# Patient Record
Sex: MALE | Race: WHITE | Employment: PART TIME | URBAN - METROPOLITAN AREA
[De-identification: names, ages, dates, MRNs, and addresses within clinical notes are randomized per-mention and may not be internally consistent; named-entity substitution may affect disease eponyms.]

---

## 2020-08-24 ENCOUNTER — HOSPITAL ENCOUNTER (INPATIENT)
Age: 55
LOS: 6 days | Discharge: HOME | DRG: 192 | End: 2020-08-30
Attending: EMERGENCY MEDICINE | Admitting: FAMILY MEDICINE
Payer: MEDICAID

## 2020-08-24 ENCOUNTER — APPOINTMENT (OUTPATIENT)
Dept: GENERAL RADIOLOGY | Age: 55
DRG: 192 | End: 2020-08-24
Attending: EMERGENCY MEDICINE
Payer: MEDICAID

## 2020-08-24 DIAGNOSIS — Z20.822 SUSPECTED COVID-19 VIRUS INFECTION: ICD-10-CM

## 2020-08-24 DIAGNOSIS — R60.0 PEDAL EDEMA: ICD-10-CM

## 2020-08-24 DIAGNOSIS — I48.91 ATRIAL FIBRILLATION WITH RAPID VENTRICULAR RESPONSE (HCC): Primary | ICD-10-CM

## 2020-08-24 DIAGNOSIS — I42.9 CARDIOMYOPATHY, UNSPECIFIED TYPE (HCC): ICD-10-CM

## 2020-08-24 DIAGNOSIS — I50.21 ACUTE SYSTOLIC CONGESTIVE HEART FAILURE (HCC): ICD-10-CM

## 2020-08-24 LAB
ALBUMIN SERPL-MCNC: 3.1 G/DL (ref 3.5–5)
ALBUMIN/GLOB SERPL: 0.6 {RATIO} (ref 1.1–2.2)
ALP SERPL-CCNC: 108 U/L (ref 45–117)
ALT SERPL-CCNC: 30 U/L (ref 12–78)
ANION GAP SERPL CALC-SCNC: 4 MMOL/L (ref 5–15)
APPEARANCE UR: CLEAR
AST SERPL-CCNC: 34 U/L (ref 15–37)
BACTERIA URNS QL MICRO: NEGATIVE /HPF
BASOPHILS # BLD: 0.1 K/UL (ref 0–0.1)
BASOPHILS NFR BLD: 1 % (ref 0–1)
BILIRUB SERPL-MCNC: 1.4 MG/DL (ref 0.2–1)
BILIRUB UR QL: NEGATIVE
BNP SERPL-MCNC: 1686 PG/ML
BUN SERPL-MCNC: 15 MG/DL (ref 6–20)
BUN/CREAT SERPL: 17 (ref 12–20)
CALCIUM SERPL-MCNC: 8.7 MG/DL (ref 8.5–10.1)
CHLORIDE SERPL-SCNC: 101 MMOL/L (ref 97–108)
CO2 SERPL-SCNC: 32 MMOL/L (ref 21–32)
COLOR UR: ABNORMAL
COMMENT, HOLDF: NORMAL
CREAT SERPL-MCNC: 0.86 MG/DL (ref 0.7–1.3)
DIFFERENTIAL METHOD BLD: ABNORMAL
EOSINOPHIL # BLD: 0.2 K/UL (ref 0–0.4)
EOSINOPHIL NFR BLD: 2 % (ref 0–7)
EPITH CASTS URNS QL MICRO: ABNORMAL /LPF
ERYTHROCYTE [DISTWIDTH] IN BLOOD BY AUTOMATED COUNT: 14.6 % (ref 11.5–14.5)
GLOBULIN SER CALC-MCNC: 4.9 G/DL (ref 2–4)
GLUCOSE BLD STRIP.AUTO-MCNC: 117 MG/DL (ref 65–100)
GLUCOSE BLD STRIP.AUTO-MCNC: 127 MG/DL (ref 65–100)
GLUCOSE BLD STRIP.AUTO-MCNC: 145 MG/DL (ref 65–100)
GLUCOSE SERPL-MCNC: 150 MG/DL (ref 65–100)
GLUCOSE UR STRIP.AUTO-MCNC: NEGATIVE MG/DL
HCT VFR BLD AUTO: 40.7 % (ref 36.6–50.3)
HGB BLD-MCNC: 12.4 G/DL (ref 12.1–17)
HGB UR QL STRIP: NEGATIVE
HYALINE CASTS URNS QL MICRO: ABNORMAL /LPF (ref 0–5)
IMM GRANULOCYTES # BLD AUTO: 0 K/UL (ref 0–0.04)
IMM GRANULOCYTES NFR BLD AUTO: 0 % (ref 0–0.5)
KETONES UR QL STRIP.AUTO: NEGATIVE MG/DL
LEUKOCYTE ESTERASE UR QL STRIP.AUTO: NEGATIVE
LYMPHOCYTES # BLD: 1.6 K/UL (ref 0.8–3.5)
LYMPHOCYTES NFR BLD: 19 % (ref 12–49)
MCH RBC QN AUTO: 27 PG (ref 26–34)
MCHC RBC AUTO-ENTMCNC: 30.5 G/DL (ref 30–36.5)
MCV RBC AUTO: 88.7 FL (ref 80–99)
MONOCYTES # BLD: 0.8 K/UL (ref 0–1)
MONOCYTES NFR BLD: 9 % (ref 5–13)
NEUTS SEG # BLD: 5.8 K/UL (ref 1.8–8)
NEUTS SEG NFR BLD: 69 % (ref 32–75)
NITRITE UR QL STRIP.AUTO: NEGATIVE
NRBC # BLD: 0 K/UL (ref 0–0.01)
NRBC BLD-RTO: 0 PER 100 WBC
PH UR STRIP: 5.5 [PH] (ref 5–8)
PLATELET # BLD AUTO: 240 K/UL (ref 150–400)
PMV BLD AUTO: 11.7 FL (ref 8.9–12.9)
POTASSIUM SERPL-SCNC: 3.6 MMOL/L (ref 3.5–5.1)
PROT SERPL-MCNC: 8 G/DL (ref 6.4–8.2)
PROT UR STRIP-MCNC: NEGATIVE MG/DL
RBC # BLD AUTO: 4.59 M/UL (ref 4.1–5.7)
RBC #/AREA URNS HPF: ABNORMAL /HPF (ref 0–5)
SAMPLES BEING HELD,HOLD: NORMAL
SERVICE CMNT-IMP: ABNORMAL
SODIUM SERPL-SCNC: 137 MMOL/L (ref 136–145)
SP GR UR REFRACTOMETRY: 1.01 (ref 1–1.03)
TROPONIN I SERPL-MCNC: <0.05 NG/ML
UR CULT HOLD, URHOLD: NORMAL
UROBILINOGEN UR QL STRIP.AUTO: 2 EU/DL (ref 0.2–1)
WBC # BLD AUTO: 8.5 K/UL (ref 4.1–11.1)
WBC URNS QL MICRO: ABNORMAL /HPF (ref 0–4)

## 2020-08-24 PROCEDURE — 81001 URINALYSIS AUTO W/SCOPE: CPT

## 2020-08-24 PROCEDURE — 85025 COMPLETE CBC W/AUTO DIFF WBC: CPT

## 2020-08-24 PROCEDURE — 65270000029 HC RM PRIVATE

## 2020-08-24 PROCEDURE — 99284 EMERGENCY DEPT VISIT MOD MDM: CPT

## 2020-08-24 PROCEDURE — 87635 SARS-COV-2 COVID-19 AMP PRB: CPT

## 2020-08-24 PROCEDURE — 74011000258 HC RX REV CODE- 258: Performed by: EMERGENCY MEDICINE

## 2020-08-24 PROCEDURE — 74011250636 HC RX REV CODE- 250/636: Performed by: FAMILY MEDICINE

## 2020-08-24 PROCEDURE — 82962 GLUCOSE BLOOD TEST: CPT

## 2020-08-24 PROCEDURE — 96365 THER/PROPH/DIAG IV INF INIT: CPT

## 2020-08-24 PROCEDURE — 74011000250 HC RX REV CODE- 250: Performed by: EMERGENCY MEDICINE

## 2020-08-24 PROCEDURE — 84484 ASSAY OF TROPONIN QUANT: CPT

## 2020-08-24 PROCEDURE — 36415 COLL VENOUS BLD VENIPUNCTURE: CPT

## 2020-08-24 PROCEDURE — 93005 ELECTROCARDIOGRAM TRACING: CPT

## 2020-08-24 PROCEDURE — 71045 X-RAY EXAM CHEST 1 VIEW: CPT

## 2020-08-24 PROCEDURE — 83880 ASSAY OF NATRIURETIC PEPTIDE: CPT

## 2020-08-24 PROCEDURE — 80053 COMPREHEN METABOLIC PANEL: CPT

## 2020-08-24 RX ORDER — INSULIN LISPRO 100 [IU]/ML
INJECTION, SOLUTION INTRAVENOUS; SUBCUTANEOUS
Status: DISCONTINUED | OUTPATIENT
Start: 2020-08-24 | End: 2020-08-30 | Stop reason: HOSPADM

## 2020-08-24 RX ORDER — DEXTROSE MONOHYDRATE 100 MG/ML
0-250 INJECTION, SOLUTION INTRAVENOUS AS NEEDED
Status: DISCONTINUED | OUTPATIENT
Start: 2020-08-24 | End: 2020-08-30 | Stop reason: HOSPADM

## 2020-08-24 RX ORDER — POLYETHYLENE GLYCOL 3350 17 G/17G
17 POWDER, FOR SOLUTION ORAL DAILY PRN
Status: DISCONTINUED | OUTPATIENT
Start: 2020-08-24 | End: 2020-08-30 | Stop reason: HOSPADM

## 2020-08-24 RX ORDER — SODIUM CHLORIDE 0.9 % (FLUSH) 0.9 %
5-40 SYRINGE (ML) INJECTION EVERY 8 HOURS
Status: DISCONTINUED | OUTPATIENT
Start: 2020-08-24 | End: 2020-08-30 | Stop reason: HOSPADM

## 2020-08-24 RX ORDER — FUROSEMIDE 10 MG/ML
40 INJECTION INTRAMUSCULAR; INTRAVENOUS ONCE
Status: COMPLETED | OUTPATIENT
Start: 2020-08-24 | End: 2020-08-24

## 2020-08-24 RX ORDER — ENOXAPARIN SODIUM 100 MG/ML
40 INJECTION SUBCUTANEOUS DAILY
Status: DISCONTINUED | OUTPATIENT
Start: 2020-08-25 | End: 2020-08-24 | Stop reason: DRUGHIGH

## 2020-08-24 RX ORDER — PROMETHAZINE HYDROCHLORIDE 25 MG/1
12.5 TABLET ORAL
Status: DISCONTINUED | OUTPATIENT
Start: 2020-08-24 | End: 2020-08-30 | Stop reason: HOSPADM

## 2020-08-24 RX ORDER — ENOXAPARIN SODIUM 150 MG/ML
150 INJECTION SUBCUTANEOUS EVERY 12 HOURS
Status: DISCONTINUED | OUTPATIENT
Start: 2020-08-24 | End: 2020-08-25

## 2020-08-24 RX ORDER — SODIUM CHLORIDE 0.9 % (FLUSH) 0.9 %
5-40 SYRINGE (ML) INJECTION AS NEEDED
Status: DISCONTINUED | OUTPATIENT
Start: 2020-08-24 | End: 2020-08-30 | Stop reason: HOSPADM

## 2020-08-24 RX ORDER — MAGNESIUM SULFATE 100 %
4 CRYSTALS MISCELLANEOUS AS NEEDED
Status: DISCONTINUED | OUTPATIENT
Start: 2020-08-24 | End: 2020-08-30 | Stop reason: HOSPADM

## 2020-08-24 RX ORDER — ONDANSETRON 2 MG/ML
4 INJECTION INTRAMUSCULAR; INTRAVENOUS
Status: DISCONTINUED | OUTPATIENT
Start: 2020-08-24 | End: 2020-08-30 | Stop reason: HOSPADM

## 2020-08-24 RX ORDER — ACETAMINOPHEN 650 MG/1
650 SUPPOSITORY RECTAL
Status: DISCONTINUED | OUTPATIENT
Start: 2020-08-24 | End: 2020-08-30 | Stop reason: HOSPADM

## 2020-08-24 RX ORDER — ACETAMINOPHEN 325 MG/1
650 TABLET ORAL
Status: DISCONTINUED | OUTPATIENT
Start: 2020-08-24 | End: 2020-08-30 | Stop reason: HOSPADM

## 2020-08-24 RX ADMIN — DILTIAZEM HYDROCHLORIDE 2.5 MG/HR: 5 INJECTION, SOLUTION INTRAVENOUS at 13:32

## 2020-08-24 RX ADMIN — Medication 10 ML: at 14:13

## 2020-08-24 RX ADMIN — Medication 10 ML: at 22:00

## 2020-08-24 RX ADMIN — FUROSEMIDE 40 MG: 10 INJECTION, SOLUTION INTRAMUSCULAR; INTRAVENOUS at 14:13

## 2020-08-24 RX ADMIN — ENOXAPARIN SODIUM 150 MG: 150 INJECTION SUBCUTANEOUS at 18:00

## 2020-08-24 NOTE — ED PROVIDER NOTES
Mr. Ashly Bolden is a 65-year-old male who presents to the ER with complaints of leg swelling and shortness of breath. He said that his symptoms started 5 days ago. He saw his doctor 2 days ago. He admits he had swelling and redness in his right leg. He was started on diuretic and an antibiotic. He said the swelling is worsened on that leg is now on the left leg. He is also had worsening redness of both legs. He said he gets short of breath with exertion. He said he also has a problem self up to sleep because he is short of breath. He denies fevers or chills. He denies similar symptoms in the past.  He denies a history of an irregular heartbeat. No runny nose, sore throat, or cough. No chest pain. No abdominal pain. Denies changes with his urine or bowel meds, apart from urinating more with a diuretic.            Past Medical History:   Diagnosis Date    Diabetes ([address]     Hypertension        Past Surgical History:   Procedure Laterality Date    HAND/FINGER SURGERY UNLISTED      HX HEENT      tonsillectomy         Family History:   Problem Relation Age of Onset    Diabetes Mother     Cancer Father         brain       Social History     Socioeconomic History    Marital status: SINGLE     Spouse name: Not on file    Number of children: Not on file    Years of education: Not on file    Highest education level: Not on file   Occupational History    Not on file   Social Needs    Financial resource strain: Not on file    Food insecurity     Worry: Not on file     Inability: Not on file    Transportation needs     Medical: Not on file     Non-medical: Not on file   Tobacco Use    Smoking status: Current Every Day Smoker     Packs/day: 1.00     Years: 30.00     Pack years: 30.00    Smokeless tobacco: Never Used   Substance and Sexual Activity    Alcohol use: No    Drug use: No    Sexual activity: Yes     Partners: Female   Lifestyle    Physical activity     Days per week: Not on file     Minutes per session: Not on file    Stress: Not on file   Relationships    Social connections     Talks on phone: Not on file     Gets together: Not on file     Attends Worship service: Not on file     Active member of club or organization: Not on file     Attends meetings of clubs or organizations: Not on file     Relationship status: Not on file    Intimate partner violence     Fear of current or ex partner: Not on file     Emotionally abused: Not on file     Physically abused: Not on file     Forced sexual activity: Not on file   Other Topics Concern    Not on file   Social History Narrative    Not on file         ALLERGIES: Sulfur    Review of Systems   Constitutional: Negative for chills and fever. HENT: Negative for rhinorrhea and sore throat. Respiratory: Positive for shortness of breath. Negative for cough. Cardiovascular: Positive for leg swelling. Negative for chest pain. Gastrointestinal: Negative for abdominal pain, diarrhea, nausea and vomiting. Genitourinary: Negative for dysuria and hematuria. Musculoskeletal: Negative for arthralgias and myalgias. Skin: Positive for rash. Negative for pallor. Neurological: Negative for dizziness, weakness and light-headedness. All other systems reviewed and are negative. Vitals:    08/24/20 0807   BP: 143/89   Pulse: 81   Resp: 18   Temp: 98.4 °F (36.9 °C)   SpO2: 96%   Weight: 152.9 kg (337 lb)   Height: 5' 10\" (1.778 m)            Physical Exam     Vital signs reviewed. Nursing notes reviewed.     Const:  No acute distress, well developed, well nourished  Head:  Atraumatic, normocephalic  Eyes:  PERRL, conjunctiva normal, no scleral icterus  Neck:  Supple, trachea midline  Cardiovascular: Tachycardic, irregularly irregular  Resp:  No resp distress, no increased work of breathing  Abd:  Soft, non-tender, non-distended, no rebound, no guarding, no CVA tenderness  MSK: Bilateral pedal edema  Neuro:  Alert and oriented x3, no cranial nerve defect  Skin: Bilateral lower extremity erythema that is not warm to the touch or tender to palpation  Psych: normal mood and affect, behavior is normal, judgement and thought content is normal          MDM  Number of Diagnoses or Management Options     Amount and/or Complexity of Data Reviewed  Clinical lab tests: ordered and reviewed  Tests in the radiology section of CPT®: ordered and reviewed  Review and summarize past medical records: yes    Patient Progress  Patient progress: stable  Critical Care Time:  35 min. Mr. Clare Gutierrez is a 63yo male who presents to the ER with complaints of SOB and worsening edema. He was found to be in afib with RVR. I started him on a dilt gtt. Xray shows edema. No pneumonia on xray. Covid-19 sent. I believe that his lower extremity edema and erythema are likely 2/2 venous stasis, rather than cellulitis. Pt.  To be evaluated for admission by the hospitalist.        Procedures

## 2020-08-24 NOTE — H&P
[facility] Adult  Hospitalist Group  History and Physical    Primary Care Provider: Annmarie Fields MD  Date of Service:  8/24/2020    Subjective:     Pia Mtz is a 63 y.o. male who presents for evaluation of shortness of breath and leg swelling. Patient has been having swelling in the legs right more than left along with shortness of breath mainly on exertion. He denies any significant orthopnea but he states he always sleeps propped up. He denies any chest pain or palpitation. He was seen by his PCP 2 days ago and was prescribed Lasix and antibiotic for redness in his legs. But his symptoms not significantly improved and therefore presented to the emergency room. Patient was noted to have atrial fibrillation with rapid ventricular response. Also has elevated BNP and chest x-ray showing cardiomegaly with early interstitial edema. Patient has no known history of atrial fibrillation. Patient was started on Cardizem drip and hospitalist service requested to admit the patient for further evaluation management. Review of Systems:    A comprehensive review of systems was negative except for that written in the History of Present Illness. Past Medical History:   Diagnosis Date    Diabetes ([address]     Hypertension       Past Surgical History:   Procedure Laterality Date    HAND/FINGER SURGERY UNLISTED      HX HEENT      tonsillectomy     Prior to Admission medications    Medication Sig Start Date End Date Taking? Authorizing Provider   hydrochlorothiazide (HYDRODIURIL) 25 mg tablet Take 25 mg by mouth daily. Provider, Historical   lisinopril (PRINIVIL, ZESTRIL) 10 mg tablet Take  by mouth daily. Provider, Historical   METHADONE HCL (METHADONE PO) Take  by mouth.     Provider, Historical     Allergies   Allergen Reactions    Sulfur Unknown (comments)      Family History   Problem Relation Age of Onset    Diabetes Mother     Cancer Father         brain        SOCIAL HISTORY:  Patient resides at Home. Patient ambulates with no assistance. Smoking history: cigarettes, 1 per day  Alcohol history: None    FAMILY HISTORY:  Diabetes  Brain cancer    Objective:       Physical Exam:   Visit Vitals  /89 (BP 1 Location: Right arm, BP Patient Position: At rest)   Pulse 81   Temp 98.4 °F (36.9 °C)   Resp 18   Ht 5' 10\" (1.778 m)   Wt 152.9 kg (337 lb)   SpO2 96%   BMI 48.35 kg/m²     General:  Alert, cooperative, no distress, appears stated age. Head:  Normocephalic, without obvious abnormality, atraumatic. Eyes:  Conjunctivae/corneas clear. PERRL, EOMs intact. Fundi benign   Ears:  Normal TMs and external ear canals both ears. Nose: Nares normal. Septum midline. Mucosa normal. No drainage or sinus tenderness. Throat: Lips, mucosa, and tongue normal. Teeth and gums normal.   Neck: Supple, symmetrical, trachea midline, no adenopathy, thyroid: no enlargement/tenderness/nodules, no carotid bruit and no JVD. Back:   Symmetric, no curvature. ROM normal. No CVA tenderness. Lungs:   Clear to auscultation bilaterally. Chest wall:  No tenderness or deformity. Heart:  Regular rate and rhythm, S1, S2 normal, no murmur, click, rub or gallop. Abdomen:   Soft, non-tender. Bowel sounds normal. No masses,  No organomegaly. Genitalia:  Deferred   Rectal:  Deferred   Extremities:  Erythematous rash present on bilateral lower extremities at the front, pitting edema bilateral lower extremities   Pulses: 2+ and symmetric all extremities. Skin: Skin color, texture, turgor normal. No rashes or lesions   Lymph nodes: Cervical, supraclavicular, and axillary nodes normal.   Neurologic: CNII-XII intact. Normal strength, sensation and reflexes throughout. Cap refill: Brisk, less than 3 seconds  Pulses: 2+, symmetric in all extremities    ECG:  normal EKG, normal sinus rhythm, unchanged from previous tracings, atrial fibrillation, rate 120     Data Review:    All diagnostic labs and studies have been reviewed. Chest x-ray Cardiomegaly with mild interstitial prominence. No prior studies for comparison. .      Assessment and Plan:     1. Atrial fibrillation with rapid ventricular response  -New onset atrial fibrillation with RVR  -Continue with diltiazem drip  -Check echocardiogram, cardiology to evaluate    2. Elevated BNP  -Also with recent weight gain, and chest x-ray with early interstitial edema concerning for CHF  -Start Lasix 40 mg IV daily  -Check echocardiogram    3. Hypertension  -Hold home medication lisinopril and HCTZ  -Starting IV Lasix and diltiazem drip  -Monitor blood pressure    4. Diabetes  -Insulin sliding scale coverage    5. DVT prophylaxis  -Lovenox and SCDs    Estimated length of stay is 2 midnights.     FUNCTIONAL STATUS PRIOR TO HOSPITALIZATION Ambulates Independently (including history of recent falls):     Signed By: Dewayne Mtz MD     August 24, 2020

## 2020-08-25 LAB
ANION GAP SERPL CALC-SCNC: 6 MMOL/L (ref 5–15)
ATRIAL RATE: 326 BPM
BUN SERPL-MCNC: 14 MG/DL (ref 6–20)
BUN/CREAT SERPL: 17 (ref 12–20)
CALCIUM SERPL-MCNC: 8.7 MG/DL (ref 8.5–10.1)
CALCULATED R AXIS, ECG10: 18 DEGREES
CALCULATED T AXIS, ECG11: 108 DEGREES
CHLORIDE SERPL-SCNC: 102 MMOL/L (ref 97–108)
CO2 SERPL-SCNC: 31 MMOL/L (ref 21–32)
CREAT SERPL-MCNC: 0.81 MG/DL (ref 0.7–1.3)
DIAGNOSIS, 93000: NORMAL
GLUCOSE BLD STRIP.AUTO-MCNC: 103 MG/DL (ref 65–100)
GLUCOSE BLD STRIP.AUTO-MCNC: 99 MG/DL (ref 65–100)
GLUCOSE SERPL-MCNC: 109 MG/DL (ref 65–100)
HEALTH STATUS, XMCV2T: NORMAL
MAGNESIUM SERPL-MCNC: 2.1 MG/DL (ref 1.6–2.4)
POTASSIUM SERPL-SCNC: 3.9 MMOL/L (ref 3.5–5.1)
Q-T INTERVAL, ECG07: 360 MS
QRS DURATION, ECG06: 86 MS
QTC CALCULATION (BEZET), ECG08: 508 MS
SARS-COV-2, COV2NT: NOT DETECTED
SERVICE CMNT-IMP: ABNORMAL
SERVICE CMNT-IMP: NORMAL
SODIUM SERPL-SCNC: 139 MMOL/L (ref 136–145)
SOURCE, COVRS: NORMAL
SPECIMEN SOURCE, FCOV2M: NORMAL
SPECIMEN TYPE, XMCV1T: NORMAL
VENTRICULAR RATE, ECG03: 120 BPM

## 2020-08-25 PROCEDURE — 74011250636 HC RX REV CODE- 250/636: Performed by: FAMILY MEDICINE

## 2020-08-25 PROCEDURE — 82962 GLUCOSE BLOOD TEST: CPT

## 2020-08-25 PROCEDURE — 74011000250 HC RX REV CODE- 250: Performed by: FAMILY MEDICINE

## 2020-08-25 PROCEDURE — 80048 BASIC METABOLIC PNL TOTAL CA: CPT

## 2020-08-25 PROCEDURE — 65660000001 HC RM ICU INTERMED STEPDOWN

## 2020-08-25 PROCEDURE — 36415 COLL VENOUS BLD VENIPUNCTURE: CPT

## 2020-08-25 PROCEDURE — 74011250637 HC RX REV CODE- 250/637: Performed by: INTERNAL MEDICINE

## 2020-08-25 PROCEDURE — 74011250636 HC RX REV CODE- 250/636: Performed by: INTERNAL MEDICINE

## 2020-08-25 PROCEDURE — 83735 ASSAY OF MAGNESIUM: CPT

## 2020-08-25 PROCEDURE — 74011636637 HC RX REV CODE- 636/637: Performed by: FAMILY MEDICINE

## 2020-08-25 PROCEDURE — 74011000258 HC RX REV CODE- 258: Performed by: FAMILY MEDICINE

## 2020-08-25 RX ORDER — FUROSEMIDE 10 MG/ML
40 INJECTION INTRAMUSCULAR; INTRAVENOUS ONCE
Status: COMPLETED | OUTPATIENT
Start: 2020-08-25 | End: 2020-08-25

## 2020-08-25 RX ORDER — METOPROLOL TARTRATE 25 MG/1
25 TABLET, FILM COATED ORAL EVERY 12 HOURS
Status: DISCONTINUED | OUTPATIENT
Start: 2020-08-25 | End: 2020-08-26

## 2020-08-25 RX ORDER — FUROSEMIDE 10 MG/ML
40 INJECTION INTRAMUSCULAR; INTRAVENOUS 2 TIMES DAILY
Status: DISCONTINUED | OUTPATIENT
Start: 2020-08-25 | End: 2020-08-27

## 2020-08-25 RX ORDER — FUROSEMIDE 10 MG/ML
20 INJECTION INTRAMUSCULAR; INTRAVENOUS 2 TIMES DAILY
Status: DISCONTINUED | OUTPATIENT
Start: 2020-08-25 | End: 2020-08-25

## 2020-08-25 RX ORDER — ENOXAPARIN SODIUM 100 MG/ML
INJECTION SUBCUTANEOUS
Status: DISPENSED
Start: 2020-08-25 | End: 2020-08-25

## 2020-08-25 RX ORDER — ENOXAPARIN SODIUM 100 MG/ML
1 INJECTION SUBCUTANEOUS EVERY 12 HOURS
Status: DISCONTINUED | OUTPATIENT
Start: 2020-08-25 | End: 2020-08-29

## 2020-08-25 RX ORDER — FUROSEMIDE 40 MG/1
40 TABLET ORAL DAILY
COMMUNITY

## 2020-08-25 RX ORDER — CEFADROXIL 500 MG/1
500 CAPSULE ORAL 2 TIMES DAILY
COMMUNITY
End: 2020-08-30

## 2020-08-25 RX ORDER — METHADONE HYDROCHLORIDE 10 MG/1
40 TABLET ORAL DAILY
Status: DISCONTINUED | OUTPATIENT
Start: 2020-08-25 | End: 2020-08-30 | Stop reason: HOSPADM

## 2020-08-25 RX ADMIN — FUROSEMIDE 40 MG: 10 INJECTION, SOLUTION INTRAMUSCULAR; INTRAVENOUS at 18:37

## 2020-08-25 RX ADMIN — Medication 10 ML: at 21:11

## 2020-08-25 RX ADMIN — METOPROLOL TARTRATE 25 MG: 25 TABLET, FILM COATED ORAL at 21:09

## 2020-08-25 RX ADMIN — METOPROLOL TARTRATE 25 MG: 25 TABLET, FILM COATED ORAL at 12:06

## 2020-08-25 RX ADMIN — DILTIAZEM HYDROCHLORIDE 5 MG/HR: 5 INJECTION, SOLUTION INTRAVENOUS at 09:07

## 2020-08-25 RX ADMIN — Medication 10 ML: at 16:09

## 2020-08-25 RX ADMIN — Medication 10 ML: at 06:00

## 2020-08-25 RX ADMIN — ENOXAPARIN SODIUM 150 MG: 150 INJECTION SUBCUTANEOUS at 08:30

## 2020-08-25 RX ADMIN — FUROSEMIDE 40 MG: 10 INJECTION, SOLUTION INTRAMUSCULAR; INTRAVENOUS at 12:06

## 2020-08-25 RX ADMIN — INSULIN LISPRO 2 UNITS: 100 INJECTION, SOLUTION INTRAVENOUS; SUBCUTANEOUS at 12:24

## 2020-08-25 RX ADMIN — Medication 10 ML: at 01:26

## 2020-08-25 RX ADMIN — METHADONE HYDROCHLORIDE 40 MG: 10 TABLET ORAL at 12:06

## 2020-08-25 NOTE — WOUND CARE
Wound Care Note:  
 
New consult placed by nurse request for bilateral cracked red legs, blisters on belly, red in belly folds Chart shows: 
Admitted for atrial fibrillation with RVR Past Medical History:  
Diagnosis Date  Diabetes ([facility]  Hypertension WBC = 8.5 on 8/24/20 Admitted from home Assessment:  
Patient is A&O x 4, communicative, continent with no assistance needed in repositioning. Bed: Total Care Diet: Diabetic consistent carb regular Patient reports no pain. Bilateral heels, buttocks, and sacral skin intact and without erythema. 1. POA bilateral lower anterior legs with blanchable erythema, no increase in temperature and no tenderness noted, legs with edema. Nourishing Skin Cream applied. 2.  POA right dorsal foot with crusted wound measuring 0.8 cm x 0.3 cm x 0.1 cm, wound bed light crusted, no drainage, wound edges are open. Hydrocolloid applied. 3.  POA right lateral thigh with lightly crusted wound measuring 1 cm x 1.5 cm x 0.1 cm, wound bed is lightly crusted, no drainage, wound edges are open, april-wound intact. Hydrocolloid applied. 4. POA pannus with some moisture and erythema, no satellite lesions or odor noted. Baby powder can be used to manage moisture. Spoke with Dr. Payam Bentley, wound care orders obtained. Patient up in chair. Recommendations:   
Bilateral lower legs- Every 12 hours apply Nourishing Skin Cream (purple tube) Right dorsal foot and right lateral thigh- Please maintain Exuderm Hydrocolloid up to one week or change as needed with soiling or rolled edges. Please use adhesive remover wipes when changing. Pannus- apply baby powder as needed Skin Care & Pressure Prevention: 
Minimize layers of linen/pads under patient to optimize support surface. Turn/reposition approximately every 2 hours and offload heels. Manage incontinence / promote continence Nourishing Skin Cream to dry skin, minimize use of briefs when able Discussed above plan with patient Lorena Abdullahi RN Transition of Care: Plan to follow as needed while admitted to hospital. 
 
PAMELLA Shelton, RN, [facility], [facility]. 
office [phone] 
pager [phone] or call  to page

## 2020-08-25 NOTE — ED NOTES
Attempted to call report to [facility] again.  RN unavailable to take report-  reports RN needs to speak with nursing supervisor prior to taking report  Charge RN notified

## 2020-08-25 NOTE — CONSULTS
CARDIOLOGY CONSULT                  Subjective:    Date of  Admission: 8/24/2020 11:28 AM     Admission type:Emergency    Nelly Stauffer is a 63 y.o. male admitted for Atrial fibrillation with RVR ([facility] [I48.91]. He presented with dyspnea and edema. Started about 5 days ago and did not improve with PO lasix and antibiotics started by PCP. Symptoms were initially exertional but then occurred with rest.   ED w/u showed elevated BNP and normal troponin. Evidence of interstitial edema.     Patient Active Problem List    Diagnosis Date Noted    Atrial fibrillation with RVR ([facility] 08/24/2020      Sven Mercado MD  Past Medical History:   Diagnosis Date    Diabetes ([facility]     Hypertension       Past Surgical History:   Procedure Laterality Date    HAND/FINGER SURGERY UNLISTED      HX HEENT      tonsillectomy     Allergies   Allergen Reactions    Sulfur Unknown (comments)      Family History   Problem Relation Age of Onset    Diabetes Mother     Cancer Father         brain      Current Facility-Administered Medications   Medication Dose Route Frequency    enoxaparin (LOVENOX) 40 mg/0.4 mL injection        metoprolol tartrate (LOPRESSOR) tablet 25 mg  25 mg Oral Q12H    furosemide (LASIX) injection 40 mg  40 mg IntraVENous BID    methadone (DOLOPHINE) tablet 40 mg  40 mg Oral DAILY    dilTIAZem (CARDIZEM) 125 mg in dextrose 5% 125 mL infusion  0-15 mg/hr IntraVENous TITRATE    sodium chloride (NS) flush 5-40 mL  5-40 mL IntraVENous Q8H    sodium chloride (NS) flush 5-40 mL  5-40 mL IntraVENous PRN    acetaminophen (TYLENOL) tablet 650 mg  650 mg Oral Q6H PRN    Or    acetaminophen (TYLENOL) suppository 650 mg  650 mg Rectal Q6H PRN    polyethylene glycol (MIRALAX) packet 17 g  17 g Oral DAILY PRN    promethazine (PHENERGAN) tablet 12.5 mg  12.5 mg Oral Q6H PRN    Or    ondansetron (ZOFRAN) injection 4 mg  4 mg IntraVENous Q6H PRN    insulin lispro (HUMALOG) injection   SubCUTAneous AC&HS    glucose chewable tablet 16 g  4 Tab Oral PRN    glucagon (GLUCAGEN) injection 1 mg  1 mg IntraMUSCular PRN    dextrose 10% infusion 0-250 mL  0-250 mL IntraVENous PRN    enoxaparin (LOVENOX) injection 150 mg  150 mg SubCUTAneous Q12H         Review of Symptoms:  Gen - no F/C/S  Eyes - no vision changes  ENT - no sore throat, rhinorrhea, otalgia  CV - no CP, no palpitations, no orthopnea, no PND, + TRAVON  Resp no cough, + SOB/MORTENSEN  GI - no AP, no n/v/d/c   - no dysuria, no hematuria  MSK - no abnormal joint pains  Skin - no rashes  Neuro - no HA, no numbness, no weakness, no slurred speech  Psych - no change in mood         Physical Exam    Visit Vitals  /88 (BP 1 Location: Right arm, BP Patient Position: At rest)   Pulse 79   Temp 97.9 °F (36.6 °C)   Resp 23   Ht 5' 10\" (1.778 m)   Wt 158.3 kg (349 lb)   SpO2 95%   BMI 50.08 kg/m²     EXAM PERFORMED VIA [employer] SOFTWARE WITH ASSISTANCE OF RN STAFF    NAD  Skin warm and dry  Nl conjunctiva  Oropharynx without exudate.     Neck supple  No resp distress  AF on monitor  Abdomen soft and non tender  +TRAVON  Pulses 2+ radials  Neuro:  Grossly intact  Appropriate      Cardiographics    Telemetry: AFIB  ECG: atrial fibrillation,  Labs:   Recent Results (from the past 24 hour(s))   SARS-COV-2    Collection Time: 08/24/20  2:04 PM   Result Value Ref Range    Specimen source Nasopharyngeal      SARS-CoV-2 PENDING     Specimen source Nasopharyngeal      Specimen type NP Swab      Health status Symptomatic Testing     GLUCOSE, POC    Collection Time: 08/24/20  5:15 PM   Result Value Ref Range    Glucose (POC) 127 (H) 65 - 100 mg/dL    Performed by [employer] [facility]    GLUCOSE, POC    Collection Time: 08/24/20  8:59 PM   Result Value Ref Range    Glucose (POC) 145 (H) 65 - 100 mg/dL    Performed by [employer]    GLUCOSE, POC    Collection Time: 08/24/20 10:59 PM   Result Value Ref Range    Glucose (POC) 117 (H) 65 - 100 mg/dL    Performed by Debra [address], BASIC Collection Time: 08/25/20  1:34 AM   Result Value Ref Range    Sodium 139 136 - 145 mmol/L    Potassium 3.9 3.5 - 5.1 mmol/L    Chloride 102 97 - 108 mmol/L    CO2 31 21 - 32 mmol/L    Anion gap 6 5 - 15 mmol/L    Glucose 109 (H) 65 - 100 mg/dL    BUN 14 6 - 20 MG/DL    Creatinine 0.81 0.70 - 1.30 MG/DL    BUN/Creatinine ratio 17 12 - 20      GFR est AA >60 >60 ml/min/1.73m2    GFR est non-AA >60 >60 ml/min/1.73m2    Calcium 8.7 8.5 - 10.1 MG/DL   MAGNESIUM    Collection Time: 08/25/20  1:34 AM   Result Value Ref Range    Magnesium 2.1 1.6 - 2.4 mg/dL   GLUCOSE, POC    Collection Time: 08/25/20  8:35 AM   Result Value Ref Range    Glucose (POC) 103 (H) 65 - 100 mg/dL    Performed by Kasi Haley         Assessment and Plan:     This is a 63 yom with decompensated AF and evidence of volume overload    Cont with diltiazem drip  Started metoprolol to hopefully decrease dilt rate  Daily lytes  Echo pending  Increase lasix dosing  NPO at MN for NNEKA/DCCV  Further recs based on echo findings     Assessment:

## 2020-08-25 NOTE — PROGRESS NOTES
ROMY  RUR-9%-Low    1. Patient to transition home when medically stable, pending other treatment helto     Reason for Admission:   Shortness of breath and leg swelling                   RUR Score:         9% Low            Plan for utilizing home health:     TBD     PCP: First and Last name:  Dr. Wallace Flynn   Name of Practice:    Are you a current patient: Yes/No: Yes   Approximate date of last visit: NA   Can you participate in a virtual visit with your PCP: NA                    Current Advanced Directive/Advance Care Plan: Patient is a Full code and there is no AMD on file. Patient's mother is listed as emergency contact. Transition of Care Plan:     CM attempted to reach patient via cell phone and room phone but he was not accessible. CM contacted mother who provided information to complete CM assessment. Patient lives with his mother. Per mother, Patient is independent with self-care and ADLs. Patient drives. He recently started using a cane at home due to leg swelling. Preferred pharmacies are [facility] and [facility] per mother. Patient's mother states that she does not drive so other family will provide patient with transportation home. CM to monitor.      Sofia Cruz MS

## 2020-08-25 NOTE — PROGRESS NOTES
2346-TRANSFER - IN REPORT:    Verbal report received from Freya Vera RN(name) on Sherry Cardona  being received from ED(unit) for routine progression of care      Report consisted of patients Situation, Background, Assessment and   Recommendations(SBAR). Information from the following report(s) SBAR, Kardex, ED Summary, Intake/Output, MAR, Recent Results, Med Rec Status, and Cardiac Rhythm Emperatriz Dalton  was reviewed with the receiving nurse. Opportunity for questions and clarification was provided. 0010-Assessment completed upon patients arrival to unit and care assumed. DUAL SKIN ASSESSMENT:  Primary Nurse Chencho Amanda RN and Emperatriz Dalton RN performed a dual skin assessment on this patient. The following impairements are noted: two blisters on abdomen near folds, redness under belly folds, blister on side of abdomen, redness and janie on bilateral lower legs. Dominic score is 21           Problem: Falls - Risk of  Goal: Absence of Falls  Outcome: Progressing Towards Goal  Call light in reach, Patient to call for help with toileting needs, Stay With Me (per policy), Urinal in reach, Toileting schedule/hourly rounds             Problem: Afib Pathway: Day 1  Goal: Medications  Outcome: Progressing Towards Goal  Pt on Cardizem drip, titrated down to 5mg/hr, will continue to monitor HR and BP          Hourly rounding done, pt in A fib, on room air, O2 saturation greater than 90%, using urinal, urine yellow and clear, no complaints of pain throughout shift. 0800-Bedside shift change report given to [address] [place] [address] (oncoming nurse) by Richa Guadalupe RN (offgoing nurse). Report included the following information SBAR, Kardex, ED Summary, MAR, Accordion, Recent Results, Med Rec Status and Cardiac Rhythm A fib.

## 2020-08-25 NOTE — PROGRESS NOTES
Spiritual Care Assessment/Progress Note  [facility]      NAME: Phong Herrera      MRN: [id number]  AGE: 63 y.o.  SEX: male  Protestant Affiliation: Hindu   Language: English     8/25/2020     Total Time (in minutes): 20     Spiritual Assessment begun in [facility] [place] [facility] 2 through conversation with:         [x]Patient        [] Family    [] Friend(s)        Reason for Consult: Advance medical directive consult, Initial/Spiritual assessment, patient floor     Spiritual beliefs: (Please include comment if needed)     [x] Identifies with a edil tradition: Hindu        [] Supported by a edil community:            [] Claims no spiritual orientation:           [] Seeking spiritual identity:                [] Adheres to an individual form of spirituality:           [] Not able to assess:                           Identified resources for coping:      [x] Prayer                               [] Music                  [] Guided Imagery     [x] Family/friends                 [] Pet visits     [] Devotional reading                         [] Unknown     [] Other:                                               Interventions offered during this visit: (See comments for more details)    Patient Interventions: Advance medical directive consult, Affirmation of emotions/emotional suffering, Affirmation of edil, Catharsis/review of pertinent events in supportive environment, Coping skills reviewed/reinforced, Iconic (affirming the presence of God/Higher Power), Prayer (actual)           Plan of Care:     [] Support spiritual and/or cultural needs    [] Support AMD and/or advance care planning process      [] Support grieving process   [] Coordinate Rites and/or Rituals    [] Coordination with community clergy   [] No spiritual needs identified at this time   [] Detailed Plan of Care below (See Comments)  [] Make referral to Music Therapy  [] Make referral to Pet Therapy     [] Make referral to Addiction services  [] Make referral to [facility]  [] Make referral to Spiritual Care Partner  [] No future visits requested        [x] Follow up visits as needed     Comments:  visit for initial spiritual assessment and Advance Directive (AMD) consult. Patient under Covid-19 isolation precautions. Called patient on telephone to the telephone at bedside. Patient answered telephone. Provided spiritual presence and listening as he spoke of his present thougths, feelings, and concerns. Spoke of his health and events leading to this admission. Says he is feeling a little better now. Described good support from his family and girlfriend. Is awaiting test results to rule out Covid-19 and is hopeful he will come back negative today and be able to remove isolation once that is determined. Says he is feeling a little lonely and cut off being in isolation. HE does have his telephone and his girlfriend is going to bring his  so that the staff can give it to him so he can keep his phone charged. He has been able to speak to his girlfriend and mother. He did not have any other concerns, but wished to speak about his edil and edil journey. [facility], but enjoys attending [employer] and attends a parish in [place] [address] [place].  requested for Advance Directive (AMD) consult. Patient requested information concerning AMD.  Informed patient of purpose of AMD and explained the document page by page. Also informed him I would leave a blank AMD form, a copy of the booklet, \"Your Right to Decide,\" and information care explaining services available through [facility]. Patient would like to review this material prior to making a decision. He appeared comforted and encouraged as a result of this conversation and expressed gratitude for this conversation. Visited by Rev. Maxwell Saunders MDiv, [facility], [address]   paging service: [phone])

## 2020-08-25 NOTE — PROGRESS NOTES
[facility] Adult  Hospitalist Group                                                                                          Hospitalist Progress Note  Yeyo Smith MD  Answering service: [phone] OR [phone] from in house phone        Date of Service:  2020  NAME:  Justin Blandon  :  1965  MRN:  [id number]      Admission Summary:   Justin Blandon is a 63 y.o. male who presents for evaluation of shortness of breath and leg swelling. Patient has been having swelling in the legs right more than left along with shortness of breath mainly on exertion. He denies any significant orthopnea but he states he always sleeps propped up. He denies any chest pain or palpitation. He was seen by his PCP 2 days ago and was prescribed Lasix and antibiotic for redness in his legs. But his symptoms not significantly improved and therefore presented to the emergency room. Patient was noted to have atrial fibrillation with rapid ventricular response. Also has elevated BNP and chest x-ray showing cardiomegaly with early interstitial edema. Patient has no known history of atrial fibrillation. Patient was started on Cardizem drip and hospitalist service requested to admit the patient for further evaluation management. Interval history / Subjective:   Patient seen and examined today. He said he is feeling good. Both of the legs are swollen and red  He is not feeling short of breath    He is worried about his heart rhythmAnd his dose of her methadone     Assessment & Plan:     1. Atrial fibrillation with rapid ventricular response  -New onset atrial fibrillation with RVR  -Continue with diltiazem drip  Transition to Lopressor. On Lovenox therapeutic dose  -Check echocardiogram, cardiology on board  NNEKA or direct cardioversion planned for tomorrow by cardiology       2.   Acute heart failure type unknown.  -Also with recent weight gain, and chest x-ray with early interstitial edema concerning for CHF  -Change Lasix to 40 twice daily  -Check echocardiogram     3. Hypertension  -Hold home medication lisinopril and HCTZ  -Starting IV Lasix and diltiazem drip       4 Diabetes  -Insulin sliding scale coverage  Accu-Cheks  Hemoglobin A1c     5. DVT prophylaxis  -Lovenox and SCDs        Code status:   DVT prophylaxis:     Care Plan discussed with: Patient/Family  Anticipated Disposition: Home w/Family  Anticipated Discharge: Less than 24 hours     Hospital Problems  Never Reviewed          Codes Class Noted POA    Atrial fibrillation with RVR [facility]) ICD-10-CM: I48.91  ICD-9-CM: 427.31  8/24/2020 Unknown                Review of Systems:   A comprehensive review of systems was negative except for that written in the HPI. Vital Signs:    Last 24hrs VS reviewed since prior progress note. Most recent are:  Visit Vitals  /68 (BP 1 Location: Right arm, BP Patient Position: At rest)   Pulse (!) 58   Temp 97.6 °F (36.4 °C)   Resp 17   Ht 5' 10\" (1.778 m)   Wt 158.3 kg (349 lb)   SpO2 96%   BMI 50.08 kg/m²         Intake/Output Summary (Last 24 hours) at 8/25/2020 1726  Last data filed at 8/25/2020 1603  Gross per 24 hour   Intake 97.79 ml   Output 1450 ml   Net -1352.21 ml        Physical Examination:             Constitutional:  No acute distress, cooperative, pleasant    ENT:  Oral mucosa moist, oropharynx benign. Resp:  CTA bilaterally. No wheezing/rhonchi/rales. No accessory muscle use   CV:  Regular rhythm, normal rate, no murmurs, gallops, rubs    GI:  Soft, non distended, non tender. normoactive bowel sounds, no hepatosplenomegaly     Musculoskeletal:  No edema, warm, 2+ pulses throughout    Neurologic:  Moves all extremities.   AAOx3, CN II-XII reviewed            Data Review:    Review and/or order of clinical lab test      Labs:     Recent Labs     08/24/20  0824   WBC 8.5   HGB 12.4   HCT 40.7        Recent Labs     08/25/20  0134 08/24/20  0824    137   K 3.9 3.6    101   CO2 31 32   BUN 14 15   CREA 0.81 0.86   * 150*   CA 8.7 8.7   MG 2.1  --      Recent Labs     08/24/20 0824   ALT 30      TBILI 1.4*   TP 8.0   ALB 3.1*   GLOB 4.9*     No results for input(s): INR, PTP, APTT, INREXT in the last 72 hours. No results for input(s): FE, TIBC, PSAT, FERR in the last 72 hours. No results found for: FOL, RBCF   No results for input(s): PH, PCO2, PO2 in the last 72 hours.   Recent Labs     08/24/20  0824   TROIQ <0.05     No results found for: CHOL, CHOLX, CHLST, CHOLV, HDL, HDLP, LDL, LDLC, DLDLP, TGLX, TRIGL, TRIGP, CHHD, CHHDX  Lab Results   Component Value Date/Time    Glucose (POC) 103 (H) 08/25/2020 08:35 AM    Glucose (POC) 117 (H) 08/24/2020 10:59 PM    Glucose (POC) 145 (H) 08/24/2020 08:59 PM    Glucose (POC) 127 (H) 08/24/2020 05:15 PM     Lab Results   Component Value Date/Time    Color YELLOW/STRAW 08/24/2020 08:24 AM    Appearance CLEAR 08/24/2020 08:24 AM    Specific gravity 1.009 08/24/2020 08:24 AM    pH (UA) 5.5 08/24/2020 08:24 AM    Protein Negative 08/24/2020 08:24 AM    Glucose Negative 08/24/2020 08:24 AM    Ketone Negative 08/24/2020 08:24 AM    Bilirubin Negative 08/24/2020 08:24 AM    Urobilinogen 2.0 (H) 08/24/2020 08:24 AM    Nitrites Negative 08/24/2020 08:24 AM    Leukocyte Esterase Negative 08/24/2020 08:24 AM    Epithelial cells FEW 08/24/2020 08:24 AM    Bacteria Negative 08/24/2020 08:24 AM    WBC 0-4 08/24/2020 08:24 AM    RBC 0-5 08/24/2020 08:24 AM         Medications Reviewed:     Current Facility-Administered Medications   Medication Dose Route Frequency    enoxaparin (LOVENOX) 40 mg/0.4 mL injection        metoprolol tartrate (LOPRESSOR) tablet 25 mg  25 mg Oral Q12H    furosemide (LASIX) injection 40 mg  40 mg IntraVENous BID    methadone (DOLOPHINE) tablet 40 mg  40 mg Oral DAILY    enoxaparin (LOVENOX) injection 160 mg  1 mg/kg SubCUTAneous Q12H    dilTIAZem (CARDIZEM) 125 mg in dextrose 5% 125 mL infusion  0-15 mg/hr IntraVENous TITRATE    sodium chloride (NS) flush 5-40 mL  5-40 mL IntraVENous Q8H    sodium chloride (NS) flush 5-40 mL  5-40 mL IntraVENous PRN    acetaminophen (TYLENOL) tablet 650 mg  650 mg Oral Q6H PRN    Or    acetaminophen (TYLENOL) suppository 650 mg  650 mg Rectal Q6H PRN    polyethylene glycol (MIRALAX) packet 17 g  17 g Oral DAILY PRN    promethazine (PHENERGAN) tablet 12.5 mg  12.5 mg Oral Q6H PRN    Or    ondansetron (ZOFRAN) injection 4 mg  4 mg IntraVENous Q6H PRN    insulin lispro (HUMALOG) injection   SubCUTAneous AC&HS    glucose chewable tablet 16 g  4 Tab Oral PRN    glucagon (GLUCAGEN) injection 1 mg  1 mg IntraMUSCular PRN    dextrose 10% infusion 0-250 mL  0-250 mL IntraVENous PRN     ______________________________________________________________________  EXPECTED LENGTH OF STAY: 2d 4h  ACTUAL LENGTH OF STAY:          1                 Sana mAor MD

## 2020-08-25 NOTE — PROGRESS NOTES
Bedside shift change report given to Nancy (oncoming nurse) by Loyd Mccloud (offgoing nurse). Report included the following information SBAR, Kardex, ED Summary, Intake/Output and Accordion. Pt  has been on Cradizem gtt and PO metoprolol. A fib rate controlled.

## 2020-08-25 NOTE — ROUTINE PROCESS
TRANSFER - OUT REPORT: 
 
Verbal report given to VICENTE Cruz (name) on Derrick Cardona  being transferred to [facility] (unit) for routine progression of care Report consisted of patients Situation, Background, Assessment and  
Recommendations(SBAR). Information from the following report(s) SBAR, ED Summary, MAR, Recent Results and Cardiac Rhythm AFIB  was reviewed with the receiving nurse. Lines:  
Peripheral IV 08/24/20 Left Antecubital (Active) Site Assessment Clean, dry, & intact 08/24/20 9046 Phlebitis Assessment 0 08/24/20 0829 Infiltration Assessment 0 08/24/20 0829 Dressing Status Clean, dry, & intact 08/24/20 1955 Dressing Type Tape;Transparent 08/24/20 9050 Hub Color/Line Status Green;Capped;Flushed 08/24/20 1386 Action Taken Blood drawn 08/24/20 5093 Opportunity for questions and clarification was provided. Patient transported with: 
 Monitor Registered Nurse NAVEED Gaspar

## 2020-08-25 NOTE — ACP (ADVANCE CARE PLANNING)
requested for Advance Directive (AMD) consult. Patient requested information concerning AMD.  Informed patient of purpose of AMD and explained the document page by page. Also informed him I would leave a blank AMD form, a copy of the booklet, \"Your Right to Decide,\" and information care explaining services available through [facility]. Patient would like to review this material prior to making a decision. Visited by Rev. Kai Pacheco MDiv, [facility], [facility]   paging service: [phone])

## 2020-08-26 ENCOUNTER — ANESTHESIA (OUTPATIENT)
Dept: CARDIAC CATH/INVASIVE PROCEDURES | Age: 55
DRG: 192 | End: 2020-08-26
Payer: MEDICAID

## 2020-08-26 ENCOUNTER — APPOINTMENT (OUTPATIENT)
Dept: NON INVASIVE DIAGNOSTICS | Age: 55
DRG: 192 | End: 2020-08-26
Attending: FAMILY MEDICINE
Payer: MEDICAID

## 2020-08-26 ENCOUNTER — APPOINTMENT (OUTPATIENT)
Dept: CARDIAC CATH/INVASIVE PROCEDURES | Age: 55
DRG: 192 | End: 2020-08-26
Attending: INTERNAL MEDICINE
Payer: MEDICAID

## 2020-08-26 ENCOUNTER — ANESTHESIA EVENT (OUTPATIENT)
Dept: CARDIAC CATH/INVASIVE PROCEDURES | Age: 55
DRG: 192 | End: 2020-08-26
Payer: MEDICAID

## 2020-08-26 LAB
ANION GAP SERPL CALC-SCNC: 5 MMOL/L (ref 5–15)
BUN SERPL-MCNC: 18 MG/DL (ref 6–20)
BUN/CREAT SERPL: 21 (ref 12–20)
CALCIUM SERPL-MCNC: 8.3 MG/DL (ref 8.5–10.1)
CHLORIDE SERPL-SCNC: 101 MMOL/L (ref 97–108)
CHOLEST SERPL-MCNC: 76 MG/DL
CO2 SERPL-SCNC: 29 MMOL/L (ref 21–32)
CREAT SERPL-MCNC: 0.85 MG/DL (ref 0.7–1.3)
EST. AVERAGE GLUCOSE BLD GHB EST-MCNC: 171 MG/DL
GLUCOSE BLD STRIP.AUTO-MCNC: 100 MG/DL (ref 65–100)
GLUCOSE BLD STRIP.AUTO-MCNC: 134 MG/DL (ref 65–100)
GLUCOSE BLD STRIP.AUTO-MCNC: 137 MG/DL (ref 65–100)
GLUCOSE BLD STRIP.AUTO-MCNC: 150 MG/DL (ref 65–100)
GLUCOSE BLD STRIP.AUTO-MCNC: 167 MG/DL (ref 65–100)
GLUCOSE BLD STRIP.AUTO-MCNC: 96 MG/DL (ref 65–100)
GLUCOSE SERPL-MCNC: 97 MG/DL (ref 65–100)
HBA1C MFR BLD: 7.6 % (ref 4–5.6)
HDLC SERPL-MCNC: 15 MG/DL
HDLC SERPL: 5.1 {RATIO} (ref 0–5)
LDLC SERPL CALC-MCNC: 46.6 MG/DL (ref 0–100)
LIPID PROFILE,FLP: ABNORMAL
POTASSIUM SERPL-SCNC: 3.6 MMOL/L (ref 3.5–5.1)
SERVICE CMNT-IMP: ABNORMAL
SERVICE CMNT-IMP: NORMAL
SERVICE CMNT-IMP: NORMAL
SODIUM SERPL-SCNC: 135 MMOL/L (ref 136–145)
TRIGL SERPL-MCNC: 72 MG/DL (ref ?–150)
VLDLC SERPL CALC-MCNC: 14.4 MG/DL

## 2020-08-26 PROCEDURE — 77030039046 HC PAD DEFIB RADIOTRNSPNT CNMD -B

## 2020-08-26 PROCEDURE — 65660000000 HC RM CCU STEPDOWN

## 2020-08-26 PROCEDURE — 74011250637 HC RX REV CODE- 250/637: Performed by: INTERNAL MEDICINE

## 2020-08-26 PROCEDURE — 36415 COLL VENOUS BLD VENIPUNCTURE: CPT

## 2020-08-26 PROCEDURE — 74011250636 HC RX REV CODE- 250/636: Performed by: NURSE ANESTHETIST, CERTIFIED REGISTERED

## 2020-08-26 PROCEDURE — 74011000258 HC RX REV CODE- 258: Performed by: INTERNAL MEDICINE

## 2020-08-26 PROCEDURE — 76060000031 HC ANESTHESIA FIRST 0.5 HR

## 2020-08-26 PROCEDURE — 93005 ELECTROCARDIOGRAM TRACING: CPT

## 2020-08-26 PROCEDURE — 80061 LIPID PANEL: CPT

## 2020-08-26 PROCEDURE — 80048 BASIC METABOLIC PNL TOTAL CA: CPT

## 2020-08-26 PROCEDURE — B24BZZ4 ULTRASONOGRAPHY OF HEART WITH AORTA, TRANSESOPHAGEAL: ICD-10-PCS | Performed by: INTERNAL MEDICINE

## 2020-08-26 PROCEDURE — 74011250636 HC RX REV CODE- 250/636: Performed by: INTERNAL MEDICINE

## 2020-08-26 PROCEDURE — 83036 HEMOGLOBIN GLYCOSYLATED A1C: CPT

## 2020-08-26 PROCEDURE — 74011000258 HC RX REV CODE- 258: Performed by: NURSE ANESTHETIST, CERTIFIED REGISTERED

## 2020-08-26 PROCEDURE — 5A2204Z RESTORATION OF CARDIAC RHYTHM, SINGLE: ICD-10-PCS | Performed by: INTERNAL MEDICINE

## 2020-08-26 PROCEDURE — 92960 CARDIOVERSION ELECTRIC EXT: CPT

## 2020-08-26 RX ORDER — PROPOFOL 10 MG/ML
INJECTION, EMULSION INTRAVENOUS AS NEEDED
Status: DISCONTINUED | OUTPATIENT
Start: 2020-08-26 | End: 2020-08-26 | Stop reason: HOSPADM

## 2020-08-26 RX ORDER — LOSARTAN POTASSIUM 25 MG/1
25 TABLET ORAL DAILY
Status: DISCONTINUED | OUTPATIENT
Start: 2020-08-26 | End: 2020-08-28

## 2020-08-26 RX ORDER — SPIRONOLACTONE 25 MG/1
12.5 TABLET ORAL DAILY
Status: DISCONTINUED | OUTPATIENT
Start: 2020-08-27 | End: 2020-08-28

## 2020-08-26 RX ORDER — SODIUM CHLORIDE 9 MG/ML
INJECTION, SOLUTION INTRAVENOUS
Status: DISCONTINUED | OUTPATIENT
Start: 2020-08-26 | End: 2020-08-26 | Stop reason: HOSPADM

## 2020-08-26 RX ORDER — METOPROLOL TARTRATE 50 MG/1
50 TABLET ORAL EVERY 12 HOURS
Status: DISCONTINUED | OUTPATIENT
Start: 2020-08-26 | End: 2020-08-30 | Stop reason: HOSPADM

## 2020-08-26 RX ADMIN — PROPOFOL 20 MG: 10 INJECTION, EMULSION INTRAVENOUS at 13:29

## 2020-08-26 RX ADMIN — AMIODARONE HYDROCHLORIDE 1 MG/MIN: 50 INJECTION, SOLUTION INTRAVENOUS at 14:33

## 2020-08-26 RX ADMIN — PROPOFOL 30 MG: 10 INJECTION, EMULSION INTRAVENOUS at 13:27

## 2020-08-26 RX ADMIN — SODIUM CHLORIDE: 9 INJECTION, SOLUTION INTRAVENOUS at 13:15

## 2020-08-26 RX ADMIN — FUROSEMIDE 40 MG: 10 INJECTION, SOLUTION INTRAMUSCULAR; INTRAVENOUS at 17:36

## 2020-08-26 RX ADMIN — PROPOFOL 50 MG: 10 INJECTION, EMULSION INTRAVENOUS at 13:24

## 2020-08-26 RX ADMIN — ENOXAPARIN SODIUM 160 MG: 80 INJECTION SUBCUTANEOUS at 10:10

## 2020-08-26 RX ADMIN — Medication 10 ML: at 21:38

## 2020-08-26 RX ADMIN — Medication 10 ML: at 16:12

## 2020-08-26 RX ADMIN — DEXTROSE MONOHYDRATE 150 MG: 5 INJECTION, SOLUTION INTRAVENOUS at 13:43

## 2020-08-26 RX ADMIN — AMIODARONE HYDROCHLORIDE 0.5 MG/MIN: 50 INJECTION, SOLUTION INTRAVENOUS at 21:11

## 2020-08-26 RX ADMIN — METOPROLOL TARTRATE 25 MG: 25 TABLET, FILM COATED ORAL at 09:32

## 2020-08-26 RX ADMIN — FUROSEMIDE 40 MG: 10 INJECTION, SOLUTION INTRAMUSCULAR; INTRAVENOUS at 09:32

## 2020-08-26 RX ADMIN — METOPROLOL TARTRATE 50 MG: 50 TABLET, FILM COATED ORAL at 21:14

## 2020-08-26 RX ADMIN — LOSARTAN POTASSIUM 25 MG: 25 TABLET, FILM COATED ORAL at 17:36

## 2020-08-26 RX ADMIN — Medication 10 ML: at 06:18

## 2020-08-26 RX ADMIN — PROPOFOL 100 MG: 10 INJECTION, EMULSION INTRAVENOUS at 13:22

## 2020-08-26 RX ADMIN — METHADONE HYDROCHLORIDE 40 MG: 10 TABLET ORAL at 10:00

## 2020-08-26 NOTE — PROGRESS NOTES
Problem: Falls - Risk of  Goal: Absence of Falls  Outcome: Progressing Towards Goal  Call light in reach, Patient to call for help with toileting needs, Stay With Me (per policy), Urinal in reach, Toileting schedule/hourly rounds           Problem: Afib Pathway: Day 2  Goal: Medications  Outcome: Progressing Towards Goal  Pt off cardizem drip, on Metoprolol  25 PO every 12 hours, HR remains less than 100        0030-Bedside shift change report given to [address] [place] [address] (oncoming nurse) by Gracy Benavides RN (offgoing nurse). Report included the following information SBAR, Kardex, ED Summary, Intake/Output, MAR, Recent Results, Med Rec Status and Cardiac Rhythm A fib.

## 2020-08-26 NOTE — PROCEDURES
Procedure:  TRANSESOPHAGEAL ECHO    Indication:     Description:  The patient  was brought to the holding area in a fasting state. Both benefits and risks of the procedure were explained in detail to the patient. Patient understands the risks of major complications  Serious complications including death, sustained ventricular tachycardia, and severe angina have been estimated at less than 1 in 5000. There is 1 in 10,000 risk of perforationMethemoglobinemia is very rare. Two in 3000 cased serious complications related to patient sedation  Minor complications  Minor complications are seen in fewer than 1 in 500 examinations. These include transient bronchospasm, transient hypoxia, nonsustained ventricular tachycardia, transient atrial fibrillation, minimal hemoptysis, vomiting, or pharyngeal hematoma. The oropharynx was locally anesthesized using benzocaine spray. Patient received anesthesia via anesthesia team. Insertion of the probe was uneventful. Patient tolerated the procedure well, there were no complications. FINDINGS  1. Severely reduced systolic function  2. LA smoke but no SHARON thrombus  3. Mild MR    Successful DCCV of AF--> NSR with x1 200 J and then x2 360 J synchronized shocks.

## 2020-08-26 NOTE — NURSE NAVIGATOR
Chart reviewed by Heart Failure Nurse Navigator. Heart Failure database completed. EF:  Echo pending    ACEi/ARB/ARNi: lisinopril 10 mg daily     BB:     Aldosterone Antagonist:     Obstructive Sleep Apnea Screening:   STOP-BANG score:   Referred to Sleep Medicine:     CRT **. NYHA Functional Class documentation requested. Heart Failure Teach Back in Patient Education. Heart Failure Avoiding Triggers on Discharge Instructions. Cardiologist: not yet consulted      Post discharge follow up phone call to be made within 48-72 hours of discharge.

## 2020-08-26 NOTE — PROGRESS NOTES
Got notified that last night dose of lovenox not given by the night nurse. No hold orders were there and no contact to MD  made for holding it.    He missed his dose last night for no apparent reasons!!!

## 2020-08-26 NOTE — ANESTHESIA PREPROCEDURE EVALUATION
Relevant Problems   No relevant active problems       Anesthetic History   No history of anesthetic complications            Review of Systems / Medical History  Patient summary reviewed, nursing notes reviewed and pertinent labs reviewed    Pulmonary  Within defined limits                 Neuro/Psych   Within defined limits           Cardiovascular    Hypertension        Dysrhythmias : atrial fibrillation      Exercise tolerance: <4 METS     GI/Hepatic/Renal                Endo/Other    Diabetes    Morbid obesity     Other Findings            Physical Exam    Airway  Mallampati: II  TM Distance: > 6 cm  Neck ROM: normal range of motion   Mouth opening: Normal     Cardiovascular    Rhythm: regular  Rate: normal         Dental  No notable dental hx       Pulmonary  Breath sounds clear to auscultation               Abdominal         Other Findings            Anesthetic Plan    ASA: 3  Anesthesia type: MAC          Induction: Intravenous  Anesthetic plan and risks discussed with: Patient

## 2020-08-26 NOTE — PROGRESS NOTES
Cardiology Progress Note  2020     Admit Date: 2020  Admit Diagnosis: Atrial fibrillation with RVR ([facility] [address] [I48.91]  CC: none currently    Assessment:   Active Problems:    Atrial fibrillation with RVR ([address] (2020)      Plan:     NPO for NNEKA/DCCV today  TTE pending  Increased metoprolol, off of diltiazem drip  Daily lytes  Ischemic evaluation based on EF    Subjective:      Red Cardona has no cardiac c/o. HR controlled. Feels continued improvement    Objective:    Physical Exam:  Overall VSSAF;    Visit Vitals  BP (!) 149/103 (BP Patient Position: At rest)   Pulse 78   Temp 97.9 °F (36.6 °C)   Resp 15   Ht 5' 10\" (1.778 m)   Wt (!) 164.7 kg (363 lb 1.6 oz)   SpO2 96%   BMI 52.10 kg/m²     Temp (24hrs), Av.9 °F (36.6 °C), Min:97.6 °F (36.4 °C), Max:98.2 °F (36.8 °C)    Patient Vitals for the past 8 hrs:   Pulse   20 1239 78   20 1222 72   20 0932 67   20 0732 (!) 55   20 0657 80   20 0550 74    Patient Vitals for the past 8 hrs:   Resp   20 1239 15   20 1222 14   20 0732 21   20 0657 18   20 0550 13    Patient Vitals for the past 8 hrs:   BP   20 1239 (!) 149/103   20 1222 145/87   20 0932 112/70   20 0732 (!) 159/98   20 0657 137/80   20 0550 131/85       1901 -  0700  In: 541 [P.O.:400; I.V.:141]  Out: 1625 [Urine:1625]      General Appearance: Well developed, well nourished, no acute distress. Ears/Nose/Mouth/Throat:   Normal MM; anicteric. JVP: WNL   Resp:   Lungs clear to auscultation bilaterally. Nl resp effort. Cardiovascular:  Irreg   Abdomen:   Soft, non-tender, bowel sounds are present. Extremities: No edema bilaterally. Skin:  Neuro: Warm and dry.   A/O x3, grossly nonfocal                         Data Review:     Telemetry independently reviewed :   AFIB       ECG independently reviewed: AF  Labs:   Recent Results (from the past 24 hour(s))   GLUCOSE, POC Collection Time: 08/25/20  5:06 PM   Result Value Ref Range    Glucose (POC) 134 (H) 65 - 100 mg/dL    Performed by Travis 13, POC    Collection Time: 08/25/20 11:53 PM   Result Value Ref Range    Glucose (POC) 99 65 - 100 mg/dL    Performed by Luciano Syed RN    LIPID PANEL    Collection Time: 08/26/20  4:39 AM   Result Value Ref Range    LIPID PROFILE          Cholesterol, total 76 <200 MG/DL    Triglyceride 72 <150 MG/DL    HDL Cholesterol 15 MG/DL    LDL, calculated 46.6 0 - 100 MG/DL    VLDL, calculated 14.4 MG/DL    CHOL/HDL Ratio 5.1 (H) 0.0 - 5.0     HEMOGLOBIN A1C WITH EAG    Collection Time: 08/26/20  4:39 AM   Result Value Ref Range    Hemoglobin A1c 7.6 (H) 4.0 - 5.6 %    Est. average glucose 760 mg/dL   METABOLIC PANEL, BASIC    Collection Time: 08/26/20  4:39 AM   Result Value Ref Range    Sodium 135 (L) 136 - 145 mmol/L    Potassium 3.6 3.5 - 5.1 mmol/L    Chloride 101 97 - 108 mmol/L    CO2 29 21 - 32 mmol/L    Anion gap 5 5 - 15 mmol/L    Glucose 97 65 - 100 mg/dL    BUN 18 6 - 20 MG/DL    Creatinine 0.85 0.70 - 1.30 MG/DL    BUN/Creatinine ratio 21 (H) 12 - 20      GFR est AA >60 >60 ml/min/1.73m2    GFR est non-AA >60 >60 ml/min/1.73m2    Calcium 8.3 (L) 8.5 - 10.1 MG/DL   GLUCOSE, POC    Collection Time: 08/26/20  8:41 AM   Result Value Ref Range    Glucose (POC) 96 65 - 100 mg/dL    Performed by Jamila Hood 17, POC    Collection Time: 08/26/20 12:14 PM   Result Value Ref Range    Glucose (POC) 100 65 - 100 mg/dL    Performed by Kathryne Bosworth       Current medications reviewed       Paige Cronin MD

## 2020-08-26 NOTE — PROGRESS NOTES
Cardiac Cath Lab Recovery Arrival Note:      Jackie Sever Merkel arrived to Cardiac Cath Lab, Recovery Area. Staff introduced to patient. Patient identifiers verified with NAME and DATE OF BIRTH. Procedure verified with patient. Consent forms reviewed and signed by patient or authorized representative and verified. Allergies verified. Patient and family oriented to department. Patient and family informed of procedure and plan of care. Questions answered with review. Patient prepped for procedure, per orders from physician, prior to arrival.    Patient on cardiac monitor, non-invasive blood pressure, SPO2 monitor. On RA. Patient is A&Ox 4. Patient reports no pain. Patient in stretcher, in low position, with side rails up, call bell within reach, patient instructed to call if assistance as needed. Patient prep in: [address], [place] [address]. Patient family has pager #   Family in: . Prep by: SANTOS Araya RN

## 2020-08-26 NOTE — PROGRESS NOTES
Bedside and Verbal shift change report given to Sharron Mccloud (oncoming nurse) by Jason Landry RN (offgoing nurse). Report included the following information SBAR, Kardex, ED Summary, Intake/Output, MAR, Accordion, Recent Results and Cardiac Rhythm NSR. Problem: Falls - Risk of  Goal: *Absence of Falls  Description: Document Ivar Du Fall Risk and appropriate interventions in the flowsheet. Outcome: Progressing Towards Goal  Note: Fall Risk Interventions:            Medication Interventions: Evaluate medications/consider consulting pharmacy, Teach patient to arise slowly    Elimination Interventions: Call light in reach, Toilet paper/wipes in reach              Problem: Afib Pathway: Day 2  Goal: Diagnostic Test/Procedures  Outcome: Progressing Towards Goal  Note: Patient had NNEKA and Cardioversion today. Goal: Medications  Outcome: Progressing Towards Goal  Note: Patient receiving PO Metoprolol. Post cardioversion, patient on Amiodarone drip. Goal: Respiratory  Outcome: Progressing Towards Goal  Note: Patient on room air. Oxygen saturations maintained in 90s. Problem: Diabetes Self-Management  Goal: *Disease process and treatment process  Description: Define diabetes and identify own type of diabetes; list 3 options for treating diabetes. Outcome: Progressing Towards Goal  Note: Patient indicates a lack of understanding regarding diabetes management and diagnosis. Diabetic management consulted.

## 2020-08-26 NOTE — PROGRESS NOTES
TRANSFER - IN REPORT:    Verbal report received from [address] on Gabby Cardona  being received from procedural area for routine progression of care. Report consisted of patients Situation, Background, Assessment and Recommendations(SBAR). Information from the following report(s) Procedure Summary, MAR and Recent Results was reviewed with the receiving clinician. Opportunity for questions and clarification was provided. Assessment completed upon patients arrival to [facility] and care assumed. Cardiac Cath Lab Recovery Arrival Note:    Gabby Cardona arrived to [facility] recovery area. Patient procedure= CV. Patient on cardiac monitor, non-invasive blood pressure, SPO2 monitor. On  or O2 @ 2 lpm via NC.  IV  of NS on pump at 25 ml/hr. Patient status doing well without problems. Patient is A&Ox 3. Patient reports no c/o's.

## 2020-08-26 NOTE — PROGRESS NOTES
Cardiac Cath Lab Procedure Area Arrival Note:    Josef Cardona arrived to Cardiac Cath Lab, Procedure Area. Patient identifiers verified with NAME and DATE OF BIRTH. Procedure verified with patient. Consent forms verified. Allergies verified. Patient informed of procedure and plan of care. Questions answered with review. Patient voiced understanding of procedure and plan of care. Patient on cardiac monitor, non-invasive blood pressure, SPO2 monitor. On room air then placed on O2 @ 2 lpm via NC.  IV of normal saline on pump at 25 ml/hr. Patient status doing well without problems. Patient is A&Ox 4. Patient reports no pain. Patient medicated during procedure with orders obtained and verified by Dr. Florin Foss. Refer to patients Cardiac Cath Lab PROCEDURE REPORT for vital signs, assessment, status, and response during procedure, printed at end of case. Printed report on chart or scanned into chart.

## 2020-08-26 NOTE — ANESTHESIA POSTPROCEDURE EVALUATION
Post-Anesthesia Evaluation and Assessment    Patient: Niki Jamison MRN: [id number]  SSN: [id number]    YOB: 1965  Age: 63 y.o. Sex: male      I have evaluated the patient and they are stable and ready for discharge from the PACU. Cardiovascular Function/Vital Signs  Visit Vitals  /79 (BP 1 Location: Left arm, BP Patient Position: At rest;Sitting)   Pulse 63   Temp 36.8 °C (98.2 °F)   Resp 13   Ht 5' 10\" (1.778 m)   Wt (!) 164.7 kg (363 lb 1.6 oz)   SpO2 96%   BMI 52.10 kg/m²       Patient is status post * No anesthesia type entered * anesthesia for * No procedures listed *. Nausea/Vomiting: None    Postoperative hydration reviewed and adequate. Pain:  Pain Scale 1: Numeric (0 - 10) (08/26/20 1350)  Pain Intensity 1: 0 (08/26/20 1350)   Managed    Neurological Status:   Neuro  Neurologic State: Alert;Eyes open to stimulus; Appropriate for age (08/26/20 0830)  Orientation Level: Oriented X4;Appropriate for age (08/26/20 0830)  Cognition: Appropriate decision making; Appropriate for age attention/concentration; Appropriate safety awareness; Follows commands;Recognition of people/places (08/26/20 0830)  Speech: Appropriate for age (08/26/20 0830)  LUE Motor Response: Purposeful (08/26/20 0830)  LLE Motor Response: Purposeful;Numbness;Tingling (08/26/20 0830)  RUE Motor Response: Purposeful (08/26/20 0830)  RLE Motor Response: Purposeful;Numbness;Tingling (08/26/20 0830)   At baseline    Mental Status, Level of Consciousness: Alert and  oriented to person, place, and time    Pulmonary Status:   O2 Device: Nasal cannula (08/26/20 1350)   Adequate oxygenation and airway patent    Complications related to anesthesia: None    Post-anesthesia assessment completed. No concerns    Signed By: Edwin Erickson MD     August 26, 2020              * No procedures listed *. MAC    <BSHSIANPOST>    INITIAL Post-op Vital signs: No vitals data found for the desired time range.

## 2020-08-26 NOTE — PROGRESS NOTES
Physician Progress Note      PATIENTEAGLE Red  CSN #:                  [id number]  :                       1965  ADMIT DATE:       2020 11:28 AM  DISCH DATE:  RESPONDING  PROVIDER #:        Wendy Jacobs MD          QUERY TEXT:    [place] 56 Attending,    Patient admitted with BMI 50.8. If possible, please document in progress notes and discharge summary if you are evaluating and /or treating any of the following: The medical record reflects the following:  Risk Factors: 63 WM  Clinical Indicators: 5'10\"  349 lbs hx/o DM II and HTN  Treatment: diabetic consistent carb diet, I&Os, SSI and BG monitoring, PO Lopressor 25 mg PO BID    Thank you  Mary CAN RN [facility]  Clinical Documentation Improvement  Office Phone   Options provided:  -- Morbid obesity  -- Obesity  -- BMI not clinically significant  -- Other - I will add my own diagnosis  -- Disagree - Not applicable / Not valid  -- Disagree - Clinically unable to determine / Unknown  -- Refer to Clinical Documentation Reviewer    PROVIDER RESPONSE TEXT:    This patient has morbid obesity.       Query created by: Mariah Loja on 2020 11:38 AM      Electronically signed by:  Mavis Castanon MD 2020 7:10 PM

## 2020-08-26 NOTE — PROGRESS NOTES
Pt had an uneventful night. Received all meds and care according to orders. Vitals remained stable. Safety parameters maintained. Pt NPO since midnight. Effective handoff given to oncoming Nurse(s) Karen Latif and her preceptee.

## 2020-08-26 NOTE — PROGRESS NOTES
[facility] Adult  Hospitalist Group                                                                                          Hospitalist Progress Note  Sandra Granado MD  Answering service: [phone] from in house phone        Date of Service:  2020  NAME:  Azalia Luna  :  1965  MRN:  [id number]      Admission Summary:   Azalia Luna is a 63 y.o. male who presents for evaluation of shortness of breath and leg swelling. Patient has been having swelling in the legs right more than left along with shortness of breath mainly on exertion. He denies any significant orthopnea but he states he always sleeps propped up. He denies any chest pain or palpitation. He was seen by his PCP 2 days ago and was prescribed Lasix and antibiotic for redness in his legs. But his symptoms not significantly improved and therefore presented to the emergency room. Patient was noted to have atrial fibrillation with rapid ventricular response. Also has elevated BNP and chest x-ray showing cardiomegaly with early interstitial edema. Patient has no known history of atrial fibrillation. Patient was started on Cardizem drip and hospitalist service requested to admit the patient for further evaluation management. Interval history / Subjective:   Patient seen and examined today. he is feeling ok and goinmg for cardioversion      Assessment & Plan:     1. Atrial fibrillation with rapid ventricular response  -New onset atrial fibrillation with RVR  Was on diltiazem drip  Transition to Lopressor. On Lovenox therapeutic dose  Pending  echocardiogram, cardiology on board  NNEKA with no clot   S/P DCCV ,  , successful        2. Acute heart failure type unknown.  -Also with recent weight gain, and chest x-ray with early interstitial edema concerning for CHF  -Change Lasix to 40 twice daily  -Check echocardiogram? Still pending      3.  Hypertension  -Hold home medication lisinopril and HCTZ  On IV Lasix  And lopressor        4 Diabetes  -Insulin sliding scale coverage  Accu-Cheks  Hemoglobin A1c 7.6      5. DVT prophylaxis  -Lovenox and SCDs        Code status: full  DVT prophylaxis: scd    Care Plan discussed with: Patient/Family  Anticipated Disposition: Home w/Family  Anticipated Discharge: Less than 24 hours     Hospital Problems  Never Reviewed          Codes Class Noted POA    Atrial fibrillation with RVR [facility]) ICD-10-CM: I48.91  ICD-9-CM: 427.31  8/24/2020 Unknown                Review of Systems:   A comprehensive review of systems was negative except for that written in the HPI. Vital Signs:    Last 24hrs VS reviewed since prior progress note. Most recent are:  Visit Vitals  /69 (BP 1 Location: Left arm, BP Patient Position: At rest)   Pulse 62   Temp 98.2 °F (36.8 °C)   Resp 22   Ht 5' 10\" (1.778 m)   Wt (!) 164.7 kg (363 lb 1.6 oz)   SpO2 93%   BMI 52.10 kg/m²         Intake/Output Summary (Last 24 hours) at 8/26/2020 1823  Last data filed at 8/26/2020 1741  Gross per 24 hour   Intake 593.17 ml   Output 300 ml   Net 293.17 ml        Physical Examination:             Constitutional:  No acute distress, cooperative, pleasant    ENT:  Oral mucosa moist, oropharynx benign. Resp:  CTA bilaterally. No wheezing/rhonchi/rales. No accessory muscle use   CV:  Regular rhythm, normal rate, no murmurs, gallops, rubs    GI:  Soft, non distended, non tender. normoactive bowel sounds, no hepatosplenomegaly     Musculoskeletal:  No edema, warm, 2+ pulses throughout    Neurologic:  Moves all extremities.   AAOx3, CN II-XII reviewed            Data Review:    Review and/or order of clinical lab test      Labs:     Recent Labs     08/24/20  0824   WBC 8.5   HGB 12.4   HCT 40.7        Recent Labs     08/26/20  0439 08/25/20  0134 08/24/20  0824   * 139 137   K 3.6 3.9 3.6    102 101   CO2 29 31 32   BUN 18 14 15   CREA 0.85 0.81 0.86   GLU 97 109* 150*   CA 8.3* 8.7 8.7   MG  --  2.1 --      Recent Labs     08/24/20 0824   ALT 30      TBILI 1.4*   TP 8.0   ALB 3.1*   GLOB 4.9*     No results for input(s): INR, PTP, APTT, INREXT, INREXT in the last 72 hours. No results for input(s): FE, TIBC, PSAT, FERR in the last 72 hours. No results found for: FOL, RBCF   No results for input(s): PH, PCO2, PO2 in the last 72 hours.   Recent Labs     08/24/20 0824   TROIQ <0.05     Lab Results   Component Value Date/Time    Cholesterol, total 76 08/26/2020 04:39 AM    HDL Cholesterol 15 08/26/2020 04:39 AM    LDL, calculated 46.6 08/26/2020 04:39 AM    Triglyceride 72 08/26/2020 04:39 AM    CHOL/HDL Ratio 5.1 (H) 08/26/2020 04:39 AM     Lab Results   Component Value Date/Time    Glucose (POC) 137 (H) 08/26/2020 04:45 PM    Glucose (POC) 100 08/26/2020 12:14 PM    Glucose (POC) 96 08/26/2020 08:41 AM    Glucose (POC) 99 08/25/2020 11:53 PM    Glucose (POC) 134 (H) 08/25/2020 05:06 PM     Lab Results   Component Value Date/Time    Color YELLOW/STRAW 08/24/2020 08:24 AM    Appearance CLEAR 08/24/2020 08:24 AM    Specific gravity 1.009 08/24/2020 08:24 AM    pH (UA) 5.5 08/24/2020 08:24 AM    Protein Negative 08/24/2020 08:24 AM    Glucose Negative 08/24/2020 08:24 AM    Ketone Negative 08/24/2020 08:24 AM    Bilirubin Negative 08/24/2020 08:24 AM    Urobilinogen 2.0 (H) 08/24/2020 08:24 AM    Nitrites Negative 08/24/2020 08:24 AM    Leukocyte Esterase Negative 08/24/2020 08:24 AM    Epithelial cells FEW 08/24/2020 08:24 AM    Bacteria Negative 08/24/2020 08:24 AM    WBC 0-4 08/24/2020 08:24 AM    RBC 0-5 08/24/2020 08:24 AM         Medications Reviewed:     Current Facility-Administered Medications   Medication Dose Route Frequency    metoprolol tartrate (LOPRESSOR) tablet 50 mg  50 mg Oral Q12H    amiodarone (CORDARONE) 375 mg/250 mL D5W infusion  0.5-1 mg/min IntraVENous TITRATE    losartan (COZAAR) tablet 25 mg  25 mg Oral DAILY    [START ON 8/27/2020] spironolactone (ALDACTONE) tablet 12.5 mg 12.5 mg Oral DAILY    furosemide (LASIX) injection 40 mg  40 mg IntraVENous BID    methadone (DOLOPHINE) tablet 40 mg  40 mg Oral DAILY    enoxaparin (LOVENOX) injection 160 mg  1 mg/kg SubCUTAneous Q12H    dilTIAZem (CARDIZEM) 125 mg in dextrose 5% 125 mL infusion  0-15 mg/hr IntraVENous TITRATE    sodium chloride (NS) flush 5-40 mL  5-40 mL IntraVENous Q8H    sodium chloride (NS) flush 5-40 mL  5-40 mL IntraVENous PRN    acetaminophen (TYLENOL) tablet 650 mg  650 mg Oral Q6H PRN    Or    acetaminophen (TYLENOL) suppository 650 mg  650 mg Rectal Q6H PRN    polyethylene glycol (MIRALAX) packet 17 g  17 g Oral DAILY PRN    promethazine (PHENERGAN) tablet 12.5 mg  12.5 mg Oral Q6H PRN    Or    ondansetron (ZOFRAN) injection 4 mg  4 mg IntraVENous Q6H PRN    insulin lispro (HUMALOG) injection   SubCUTAneous AC&HS    glucose chewable tablet 16 g  4 Tab Oral PRN    glucagon (GLUCAGEN) injection 1 mg  1 mg IntraMUSCular PRN    dextrose 10% infusion 0-250 mL  0-250 mL IntraVENous PRN     ______________________________________________________________________  EXPECTED LENGTH OF STAY: 2d 4h  ACTUAL LENGTH OF STAY:          2                 Patricia Ferrari MD

## 2020-08-27 ENCOUNTER — APPOINTMENT (OUTPATIENT)
Dept: NON INVASIVE DIAGNOSTICS | Age: 55
DRG: 192 | End: 2020-08-27
Attending: FAMILY MEDICINE
Payer: MEDICAID

## 2020-08-27 LAB
ANION GAP SERPL CALC-SCNC: 5 MMOL/L (ref 5–15)
ATRIAL RATE: 59 BPM
BUN SERPL-MCNC: 24 MG/DL (ref 6–20)
BUN/CREAT SERPL: 23 (ref 12–20)
CALCIUM SERPL-MCNC: 8.4 MG/DL (ref 8.5–10.1)
CALCULATED P AXIS, ECG09: 78 DEGREES
CALCULATED R AXIS, ECG10: 21 DEGREES
CALCULATED T AXIS, ECG11: 58 DEGREES
CHLORIDE SERPL-SCNC: 99 MMOL/L (ref 97–108)
CO2 SERPL-SCNC: 30 MMOL/L (ref 21–32)
CREAT SERPL-MCNC: 1.03 MG/DL (ref 0.7–1.3)
DIAGNOSIS, 93000: NORMAL
ECHO AO ROOT DIAM: 3.52 CM
ECHO AV AREA PEAK VELOCITY: 3.1 CM2
ECHO AV AREA/BSA PEAK VELOCITY: 1.2 CM2/M2
ECHO AV PEAK GRADIENT: 4.11 MMHG
ECHO AV PEAK VELOCITY: 101.35 CM/S
ECHO EST RA PRESSURE: 10 MMHG
ECHO LA AREA 4C: 23.66 CM2
ECHO LA MAJOR AXIS: 4.01 CM
ECHO LA MINOR AXIS: 1.54 CM
ECHO LA VOL 2C: 98.47 ML (ref 18–58)
ECHO LA VOL 4C: 75.45 ML (ref 18–58)
ECHO LA VOL BP: 96.95 ML (ref 18–58)
ECHO LA VOL/BSA BIPLANE: 37.35 ML/M2 (ref 16–28)
ECHO LA VOLUME INDEX A2C: 37.94 ML/M2 (ref 16–28)
ECHO LA VOLUME INDEX A4C: 29.07 ML/M2 (ref 16–28)
ECHO LV EDV A2C: 142.21 ML
ECHO LV EDV A4C: 105.75 ML
ECHO LV EDV BP: 124.24 ML (ref 67–155)
ECHO LV EDV INDEX A4C: 40.7 ML/M2
ECHO LV EDV INDEX BP: 47.9 ML/M2
ECHO LV EDV NDEX A2C: 54.8 ML/M2
ECHO LV EJECTION FRACTION A2C: 60 PERCENT
ECHO LV EJECTION FRACTION A4C: 46 PERCENT
ECHO LV EJECTION FRACTION BIPLANE: 54.1 PERCENT (ref 55–100)
ECHO LV ESV A2C: 57.38 ML
ECHO LV ESV A4C: 56.64 ML
ECHO LV ESV BP: 57.09 ML (ref 22–58)
ECHO LV ESV INDEX A2C: 22.1 ML/M2
ECHO LV ESV INDEX A4C: 21.8 ML/M2
ECHO LV ESV INDEX BP: 22 ML/M2
ECHO LV INTERNAL DIMENSION DIASTOLIC: 5.35 CM (ref 4.2–5.9)
ECHO LV INTERNAL DIMENSION DIASTOLIC: 5.6 CM (ref 4.2–5.9)
ECHO LV INTERNAL DIMENSION SYSTOLIC: 4.8 CM
ECHO LV INTERNAL DIMENSION SYSTOLIC: 4.83 CM
ECHO LV IVSD: 1.27 CM (ref 0.6–1)
ECHO LV POSTERIOR WALL DIASTOLIC: 1.23 CM (ref 0.6–1)
ECHO LVOT DIAM: 2.33 CM
ECHO LVOT PEAK GRADIENT: 2.17 MMHG
ECHO LVOT PEAK VELOCITY: 73.73 CM/S
ECHO MV A VELOCITY: 26.01 CM/S
ECHO MV AREA PHT: 3.25 CM2
ECHO MV E DECELERATION TIME (DT): 0.23 S
ECHO MV E VELOCITY: 90.69 CM/S
ECHO MV E/A RATIO: 3.49
ECHO MV PRESSURE HALF TIME (PHT): 0.07 S
ECHO PV PEAK INSTANTANEOUS GRADIENT SYSTOLIC: 3.08 MMHG
ECHO RIGHT VENTRICULAR SYSTOLIC PRESSURE (RVSP): 49.17 MMHG
ECHO RV INTERNAL DIMENSION: 6.2 CM
ECHO RV TAPSE: 2.08 CM (ref 1.5–2)
ECHO TV REGURGITANT MAX VELOCITY: 312.93 CM/S
ECHO TV REGURGITANT PEAK GRADIENT: 39.17 MMHG
GLUCOSE BLD STRIP.AUTO-MCNC: 116 MG/DL (ref 65–100)
GLUCOSE BLD STRIP.AUTO-MCNC: 122 MG/DL (ref 65–100)
GLUCOSE BLD STRIP.AUTO-MCNC: 123 MG/DL (ref 65–100)
GLUCOSE BLD STRIP.AUTO-MCNC: 140 MG/DL (ref 65–100)
GLUCOSE BLD STRIP.AUTO-MCNC: 156 MG/DL (ref 65–100)
GLUCOSE SERPL-MCNC: 125 MG/DL (ref 65–100)
MAGNESIUM SERPL-MCNC: 2.2 MG/DL (ref 1.6–2.4)
P-R INTERVAL, ECG05: 196 MS
POTASSIUM SERPL-SCNC: 4 MMOL/L (ref 3.5–5.1)
Q-T INTERVAL, ECG07: 542 MS
QRS DURATION, ECG06: 102 MS
QTC CALCULATION (BEZET), ECG08: 536 MS
SERVICE CMNT-IMP: ABNORMAL
SODIUM SERPL-SCNC: 134 MMOL/L (ref 136–145)
VENTRICULAR RATE, ECG03: 59 BPM

## 2020-08-27 PROCEDURE — 74011250637 HC RX REV CODE- 250/637: Performed by: INTERNAL MEDICINE

## 2020-08-27 PROCEDURE — 74011250636 HC RX REV CODE- 250/636: Performed by: INTERNAL MEDICINE

## 2020-08-27 PROCEDURE — 83735 ASSAY OF MAGNESIUM: CPT

## 2020-08-27 PROCEDURE — 65660000000 HC RM CCU STEPDOWN

## 2020-08-27 PROCEDURE — 74011636637 HC RX REV CODE- 636/637: Performed by: FAMILY MEDICINE

## 2020-08-27 PROCEDURE — 74011250636 HC RX REV CODE- 250/636: Performed by: FAMILY MEDICINE

## 2020-08-27 PROCEDURE — 80048 BASIC METABOLIC PNL TOTAL CA: CPT

## 2020-08-27 PROCEDURE — 36415 COLL VENOUS BLD VENIPUNCTURE: CPT

## 2020-08-27 PROCEDURE — 82962 GLUCOSE BLOOD TEST: CPT

## 2020-08-27 PROCEDURE — C8929 TTE W OR WO FOL WCON,DOPPLER: HCPCS

## 2020-08-27 RX ORDER — METFORMIN HYDROCHLORIDE 500 MG/1
1000 TABLET, FILM COATED, EXTENDED RELEASE ORAL 2 TIMES DAILY
COMMUNITY

## 2020-08-27 RX ORDER — FUROSEMIDE 10 MG/ML
40 INJECTION INTRAMUSCULAR; INTRAVENOUS DAILY
Status: DISCONTINUED | OUTPATIENT
Start: 2020-08-28 | End: 2020-08-28

## 2020-08-27 RX ADMIN — FUROSEMIDE 40 MG: 10 INJECTION, SOLUTION INTRAMUSCULAR; INTRAVENOUS at 08:44

## 2020-08-27 RX ADMIN — Medication 10 ML: at 08:02

## 2020-08-27 RX ADMIN — METOPROLOL TARTRATE 50 MG: 50 TABLET, FILM COATED ORAL at 08:44

## 2020-08-27 RX ADMIN — LOSARTAN POTASSIUM 25 MG: 25 TABLET, FILM COATED ORAL at 08:41

## 2020-08-27 RX ADMIN — ENOXAPARIN SODIUM 160 MG: 80 INJECTION SUBCUTANEOUS at 21:28

## 2020-08-27 RX ADMIN — INSULIN LISPRO 2 UNITS: 100 INJECTION, SOLUTION INTRAVENOUS; SUBCUTANEOUS at 12:49

## 2020-08-27 RX ADMIN — SPIRONOLACTONE 12.5 MG: 25 TABLET ORAL at 08:43

## 2020-08-27 RX ADMIN — METHADONE HYDROCHLORIDE 40 MG: 10 TABLET ORAL at 10:45

## 2020-08-27 RX ADMIN — METOPROLOL TARTRATE 50 MG: 50 TABLET, FILM COATED ORAL at 21:28

## 2020-08-27 RX ADMIN — ENOXAPARIN SODIUM 160 MG: 80 INJECTION SUBCUTANEOUS at 08:44

## 2020-08-27 RX ADMIN — PERFLUTREN 2 ML: 6.52 INJECTION, SUSPENSION INTRAVENOUS at 15:22

## 2020-08-27 RX ADMIN — Medication 10 ML: at 15:00

## 2020-08-27 RX ADMIN — Medication 10 ML: at 21:40

## 2020-08-27 NOTE — ACP (ADVANCE CARE PLANNING)
requested for Advance Directive (AMD) consult. Patient requested information concerning AMD.  Provided a blank copy of AMD and explained it page by page, a copy of the booklet, \"Your Right to Decide,\" and answered all of his questions concerning AMD.  Patient would like to read over this material and discuss it with his family prior to making a decision. Informed patient of availability of  and pastoral care services. Patient will ask his nurse to contact the  should he wish to complete AMD during this hospitalization. Visited by Rev. Jarrett Cordero MDiv, [facility], [facility]   paging service: [phone])

## 2020-08-27 NOTE — ACP (ADVANCE CARE PLANNING)
Advance Care Planning     Advance Care Planning Activator (Inpatient)  Conversation Note      Date of ACP Conversation: 08/27/20     Conversation Conducted with:   Patient with Decision Making Capacity    ACP Activator: Sakshi Ellis RN    *When Decision Maker makes decisions on behalf of the incapacitated patient: Decision Maker is asked to consider and make decisions based on patient values, known preferences, or best interests. Health Care Decision Maker:    Current Designated Health Care Decision Maker:   Primary Decision Maker: Mona Love -  - [phone]  (If there is a [address] named in the [facility] Makers\" box in the ACP activity, but it is not visible above, be sure to open that field and then select the health care decision maker relationship (ie \"primary\") in the blank space to the right of the name.) Validate  this information as still accurate & up-to-date; edit [place] field as needed.)    Note: Assess and validate information in current ACP documents, as indicated. If no Decision Maker listed above or available through scanned documents, then:    If no Authorized Decision Maker has previously been identified, then patient chooses [place]:  \"Who would you like to name as your primary health care decision-maker? \"    Name: Kristin Cardona Relationship: mom Phone number: [phone]  Justin Villeda this person be reached easily? \" YES  Care Preferences    Ventilation: \"If you were in your present state of health and suddenly became very ill and were unable to breathe on your own, what would your preference be about the use of a ventilator (breathing machine) if it were available to you? \"      If patient would desire the use of a ventilator (breathing machine), answer \"yes\", if not \"no\":yes    \"If your health worsens and it becomes clear that your chance of recovery is unlikely, what would your preference be about the use of a ventilator (breathing machine) if it were available to you? \"     Would the patient desire the use of a ventilator (breathing machine)? YES      Resuscitation  \"CPR works best to restart the heart when there is a sudden event, like a heart attack, in someone who is otherwise healthy. Unfortunately, CPR does not typically restart the heart for people who have serious health conditions or who are very sick. \"    \"In the event your heart stopped as a result of an underlying serious health condition, would you want attempts to be made to restart your heart (answer \"yes\" for attempt to resuscitate) or would you prefer a natural death (answer \"no\" for do not attempt to resuscitate)? \" yes    [x] Yes  [] No   Educated Patient / Jana Demarco regarding differences between Advance Directives and portable DNR orders. Length of ACP Conversation in minutes: 5     Conversation Outcomes:  [] ACP discussion completed  [] Existing advance directive reviewed with patient; no changes to patient's previously recorded wishes     [] New Advance Directive completed   [] Portable Do Not Resuscitate prepared for Provider review and signature  [] POLST/POST/MOLST/MOST prepared for Provider review and signature      Follow-up plan:    [] Schedule follow-up conversation to continue planning  [] Referred individual to Provider for additional questions/concerns   [] Advised patient/agent/surrogate to review completed ACP document and update if needed with changes in condition, patient preferences or care setting     [] This note routed to one or more involved healthcare providers  [facility]. He wants to consider before he completes. Mom is NOK.

## 2020-08-27 NOTE — PROGRESS NOTES
Cardiology Progress Note  2020     Admit Date: 2020  Admit Diagnosis: Atrial fibrillation with RVR ([facility] [address] [I48.91]  CC: none currently    Assessment:   Active Problems:    Atrial fibrillation with RVR ([facility] [address] (2020)      Plan:     Remains in NSR after DCCV, stopped amiodarone given QTc interaction with methadone  TTE pending, have called noninvasive office to expedite  Cont metoprolol  Added losartan and spironolactone as had reduced LVEF on NNEKA  Daily lytes  NPO for [facility] tomorrow    Subjective:      Sherry Cardona remains in NSR, cont to improve    Objective:    Physical Exam:  Overall VSSAF;    Visit Vitals  /77 (BP 1 Location: Right arm, BP Patient Position: At rest)   Pulse 60   Temp 98 °F (36.7 °C)   Resp 19   Ht 5' 10\" (1.778 m)   Wt 151.7 kg (334 lb 7 oz)   SpO2 96%   BMI 47.99 kg/m²     Temp (24hrs), Av.1 °F (36.7 °C), Min:97.7 °F (36.5 °C), Max:98.5 °F (36.9 °C)    Patient Vitals for the past 8 hrs:   Pulse   20 1250 60   20 1200 (!) 59   20 0800 60    Patient Vitals for the past 8 hrs:   Resp   20 1250 19   20 0800 10    Patient Vitals for the past 8 hrs:   BP   20 1250 122/77   20 0800 (!) 142/98       1901 -  0700  In: 751.2 [P.O.:400; I.V.:351.2]  Out: 800 [Urine:800]      General Appearance: Well developed, well nourished, no acute distress. Ears/Nose/Mouth/Throat:   Normal MM; anicteric. JVP: WNL   Resp:   Lungs clear to auscultation bilaterally. Nl resp effort. Cardiovascular:  Irreg   Abdomen:   Soft, non-tender, bowel sounds are present. Extremities: No edema bilaterally. Skin:  Neuro: Warm and dry.   A/O x3, grossly nonfocal                         Data Review:     Telemetry independently reviewed :   AFIB       ECG independently reviewed: AF  Labs:   Recent Results (from the past 24 hour(s))   GLUCOSE, POC    Collection Time: 20  4:45 PM   Result Value Ref Range    Glucose (POC) 137 (H) 65 - 100 mg/dL Performed by Eliud Felton.     GLUCOSE, POC    Collection Time: 08/26/20  9:00 PM   Result Value Ref Range    Glucose (POC) 150 (H) 65 - 100 mg/dL    Performed by Elza Giraldo    GLUCOSE, POC    Collection Time: 08/27/20 12:08 AM   Result Value Ref Range    Glucose (POC) 116 (H) 65 - 100 mg/dL    Performed by Elza Giraldo    METABOLIC PANEL, BASIC    Collection Time: 08/27/20  2:16 AM   Result Value Ref Range    Sodium 134 (L) 136 - 145 mmol/L    Potassium 4.0 3.5 - 5.1 mmol/L    Chloride 99 97 - 108 mmol/L    CO2 30 21 - 32 mmol/L    Anion gap 5 5 - 15 mmol/L    Glucose 125 (H) 65 - 100 mg/dL    BUN 24 (H) 6 - 20 MG/DL    Creatinine 1.03 0.70 - 1.30 MG/DL    BUN/Creatinine ratio 23 (H) 12 - 20      GFR est AA >60 >60 ml/min/1.73m2    GFR est non-AA >60 >60 ml/min/1.73m2    Calcium 8.4 (L) 8.5 - 10.1 MG/DL   MAGNESIUM    Collection Time: 08/27/20  2:16 AM   Result Value Ref Range    Magnesium 2.2 1.6 - 2.4 mg/dL   GLUCOSE, POC    Collection Time: 08/27/20  8:33 AM   Result Value Ref Range    Glucose (POC) 123 (H) 65 - 100 mg/dL    Performed by Elda Castillo, POC    Collection Time: 08/27/20 11:57 AM   Result Value Ref Range    Glucose (POC) 156 (H) 65 - 100 mg/dL    Performed by Virginie Nuñez       Current medications reviewed       David Frazier MD

## 2020-08-27 NOTE — PROGRESS NOTES
Bedside shift change report given to Tricia Donnelly RN (oncoming nurse) by Cori Davis RN (offgoing nurse). Report included the following information SBAR, Kardex, ED Summary, Procedure Summary, Intake/Output, MAR, Recent Results and Cardiac Rhythm NSR.

## 2020-08-27 NOTE — PROGRESS NOTES
visit for routine follow up and Advance Directive (AMD) consult. Patient sitting in chair at bedside, good eye contact, smiling, friendly. Says he is feeling better today. Provided spiritual presence and listening as he spoke of his present thoughts, feelings, and concerns. Spoke of his health and events leading to this hospitalization. Says he is scheduled for an echo later today and, then, doctors will make a decision about care. Expressed some feelings of fear and concern, but says he is going to take things one step at a time and not let himself become too anxious. Says his edil and trust in God helps him cope and manage. Spoke of his journey of edil. Is looking forward to the time when he can attend [employer] in person. Patient requested information concerning AMD.  Provided a blank copy of AMD and explained it page by page, a copy of the booklet, \"Your Right to Decide,\" and answered all of his questions concerning AMD.  Patient would like to read over this material and discuss it with his family prior to making a decision. Informed patient of availability of  and pastoral care services. Patient will ask his nurse to contact the  should he wish to complete AMD during this hospitalization. He appeared comforted and encouraged as a result of this visit and expressed gratitude for this visit. Visited by Rev. Radha Reaves MDiv, [facility], [facility]   paging service: [phone])

## 2020-08-27 NOTE — PROGRESS NOTES
Problem: Falls - Risk of  Goal: *Absence of Falls  Description: Document Sharon Smallwood Fall Risk and appropriate interventions in the flowsheet. Outcome: Progressing Towards Goal  Note: Fall Risk Interventions:    Medication Interventions: Evaluate medications/consider consulting pharmacy, Patient to call before getting OOB, Teach patient to arise slowly, Utilize gait belt for transfers/ambulation    Elimination Interventions: Call light in reach, Patient to call for help with toileting needs, Stay With Me (per policy), Toilet paper/wipes in reach, Toileting schedule/hourly rounds, Urinal in reach         Problem: Patient Education: Go to Patient Education Activity  Goal: Patient/Family Education  Outcome: Progressing Towards Goal     Problem: Patient Education: Go to Patient Education Activity  Goal: Patient/Family Education  Outcome: Progressing Towards Goal     Problem: Afib Pathway: Day 1  Goal: Medications  Outcome: Progressing Towards Goal    Last 3 Recorded Weights in this Encounter    08/25/20 0139 08/26/20 0446 08/27/20 0400   Weight: 158.3 kg (349 lb) (!) 164.7 kg (363 lb 1.6 oz) 151.7 kg (334 lb 7 oz)          Verbal shift change report given to Sarbjit Lugo RN (oncoming nurse) by Zeenat Cloud RN (offgoing nurse). Report included the following information SBAR, Kardex, ED Summary, Procedure Summary, Intake/Output, MAR, Accordion, Recent Results, Med Rec Status, Cardiac Rhythm NSR and Alarm Parameters .

## 2020-08-27 NOTE — DIABETES MGMT
[facility]  CLINICAL NURSE SPECIALIST CONSULT  [facility]GRAM [facility]    INITIAL NOTE    Presentation   Grey Briceno is a 63 y.o. male admitted  with SOB on exertion and bilateral lower extremity edema with redness on lower legs. Work up evaluation revealed atrial fibrillation with RVR/ elevated BNP and CXR with cardiomegaly. Was converted in to NSR yesterday and had NNEKA to evaluate cardiomegaly. Will be going for cardiac ultrasound today. HX: PMH: HTN/ DM2-A1C 7.6%  . Diabetes: Patient has known Type 2 diabetes, treated with Metformin PTA. Family history positive  for diabetes. Consulted by Provider for advanced diabetes nursing assessment and care, specifically related to   [] Transitioning off Albino Medico   [x] Inpatient management strategy  [x] Home management assessment  [x] Survival skill education    Diabetes-related medical history  Acute complications  None  Neurological complications  Peripheral neuropathy  Microvascular disease  NONE  Macrovascular disease  NONE  Other associated conditions     HTN/     Diabetes medication history  Drug class Currently in use Discontinued Never used   Biguanide Metformin 500mg -2tabs twice daily     DDP-4 inhibitor       Sulfonylurea      Thiazolidinedione      GLP-1 RA      SGLT-2 inhibitors      Basal insulin      Fixed Dose  Combinations      Bolus insulin        Subjective   Mr. Selina Hogue is a pleasant man who has had DM2 for a year. He has been taking Metformin daily and states he checks his BG once a day. He states he is followed by his PCP (Dr. Chester Wren)  for his diabetes management. 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 He denies ETOH abuse and has not taken a drink in over 10yrs. He admits to \"sniffing\" heroin in the past but has been clean for 2yrs and takes Methadone. He states he's embarassed and ashamed and doesn't like a lot of people to know.     Patient reports the following home diabetes self-care practices:  Eating pattern  [] Breakfast Piper/egg; boiled eggs w/toast x2  [] Lunch  Skips  [] Dinner  Frozen dinners- \"I don't cook\"  [] Snacks Oatmeal cakes/ Bharati Q's cakes-stopped eating those two months ago. [] Beverages Water  Physical activity pattern-ambulates freely, no exercise regimen verbalized. Works outside mostly    Monitoring pattern- only checks once a day- 150-160s BG average. Taking medications pattern  [x] Consistent administration  [x] Affordable    Social determinants of health impacting diabetes self-management practices   Concerned that you need to know more about how to stay healthy with diabetes    Objective   Physical exam  General Alert, oriented and in no acute distress/ill-appearing. Conversant and cooperative. Vital Signs   Visit Vitals  BP (!) 142/98 (BP 1 Location: Left arm, BP Patient Position: At rest)   Pulse 60   Temp 98.2 °F (36.8 °C)   Resp 10   Ht 5' 10\" (1.778 m)   Wt 151.7 kg (334 lb 7 oz)   SpO2 96%   BMI 47.99 kg/m²     Skin  Warm and dry  Heart   Regular rate and rhythm. No murmurs, rubs or gallops  Lungs  Clear to auscultation without rales or rhonchi  Extremities No foot wounds    Diabetic foot exam:    Left Foot     Visual Exam: +2 edema noted;    Pulse DP: 2+ (normal)   Filament test: reduced sensation      Right Foot   Visual Exam: no ulcers, dressing in place by wound care on front of foot -\"to cover the spot where fluid was coming out\"    Pulse DP: 2+ (normal)   Filament test: normal sensation     DP & PT pulses +2.      Laboratory  Lab Results   Component Value Date/Time    Hemoglobin A1c 7.6 (H) 08/26/2020 04:39 AM     Lab Results   Component Value Date/Time    LDL, calculated 46.6 08/26/2020 04:39 AM     Lab Results   Component Value Date/Time    Creatinine 1.03 08/27/2020 02:16 AM     Lab Results   Component Value Date/Time    Sodium 134 (L) 08/27/2020 02:16 AM    Potassium 4.0 08/27/2020 02:16 AM    Chloride 99 08/27/2020 02:16 AM    CO2 30 08/27/2020 02:16 AM    Anion gap 5 08/27/2020 02:16 AM    Glucose 125 (H) 08/27/2020 02:16 AM    BUN 24 (H) 08/27/2020 02:16 AM    Creatinine 1.03 08/27/2020 02:16 AM    BUN/Creatinine ratio 23 (H) 08/27/2020 02:16 AM    GFR est AA >60 08/27/2020 02:16 AM    GFR est non-AA >60 08/27/2020 02:16 AM    Calcium 8.4 (L) 08/27/2020 02:16 AM    Bilirubin, total 1.4 (H) 08/24/2020 08:24 AM    Alk. phosphatase 108 08/24/2020 08:24 AM    Protein, total 8.0 08/24/2020 08:24 AM    Albumin 3.1 (L) 08/24/2020 08:24 AM    Globulin 4.9 (H) 08/24/2020 08:24 AM    A-G Ratio 0.6 (L) 08/24/2020 08:24 AM    ALT (SGPT) 30 08/24/2020 08:24 AM     Lab Results   Component Value Date/Time    ALT (SGPT) 30 08/24/2020 08:24 AM       Factors affecting BG pattern  Factor Dose Comments   Nutrition:  Carb-controlled meals     60 grams/meal      Drugs:  Other: amiodarone             Blood glucose pattern        Assessment and Plan   Nursing Diagnosis Risk for unstable blood glucose pattern   Nursing Intervention Domain 5255 Decision-making Support   Nursing Interventions Examined current inpatient diabetes control   Explored factors facilitating and impeding inpatient management  Identified self-management practices impeding diabetes control  Explored corrective strategies with patient and responsible inpatient provider   Informed patient of rational for insulin strategy while hospitalized  Instructed patient in importance of checking BG three times daily; monitoring diet/carb intake. Evaluation   Mr. Di Loo with Type 2 diabetes, has achieved inpatient blood glucose target of 100-180mg/dl. BG trends over past 24h 96-150mg/dl. Requiring minimal amounts of correctional insulin thus far. Inpatient blood glucose management has been impacted by  [] Kidney dysfunction  [x] Erratic meal consumption -intermittent NPO status for testing   [] Glucocorticoid use  []  Pressor support    Recommendations   1.  IF BG trends >200mg/dl, consider INITIATING :    Low dose Basal insulin   [x] 0.1 units/kg/D =15units daily      2. CONTINUE Corrective insulin    [x] Insulin-resistant sensitivity (BMI >27)    3. On Discharge, please place an order for \"diabetes education\". This will trigger a referral for the [facility] which includes outpatient education with a certified diabetes educator. 4. Before discharge will provide additional diet/exercise/ and overall diabetes education. Referral   [] Behavioral health services  [] Inpatient nutrition services  [] Pharmacy services for medication management  [x] Diabetes Self-Management Training through [facility] (Phone [phone] to schedule appointment)    Billing Code(s)   I personally reviewed chart, notes, data and current medications in the medical record, and examined the patient at bedside before making care recommendations. Thank you for including us in their care. I spent 50 minutes in direct patient care today for this patient.   Time includes chart review, face to face with patient and collaboration with interdisciplinary care team.     CORNELIUS Gold  [facility]  Access via [address] [place] [address]  [phone]

## 2020-08-27 NOTE — PROGRESS NOTES
visit for routine follow up. Staff with patient providing care. Will continue to follow up as needed and upon request as able. Visited by .  Deep Melgar MDiv, [facility], [facility]   paging service: [phone])

## 2020-08-28 LAB
ANION GAP SERPL CALC-SCNC: 6 MMOL/L (ref 5–15)
BUN SERPL-MCNC: 23 MG/DL (ref 6–20)
BUN/CREAT SERPL: 27 (ref 12–20)
CALCIUM SERPL-MCNC: 8 MG/DL (ref 8.5–10.1)
CHLORIDE SERPL-SCNC: 100 MMOL/L (ref 97–108)
CO2 SERPL-SCNC: 30 MMOL/L (ref 21–32)
CREAT SERPL-MCNC: 0.86 MG/DL (ref 0.7–1.3)
GLUCOSE BLD STRIP.AUTO-MCNC: 117 MG/DL (ref 65–100)
GLUCOSE BLD STRIP.AUTO-MCNC: 166 MG/DL (ref 65–100)
GLUCOSE BLD STRIP.AUTO-MCNC: 78 MG/DL (ref 65–100)
GLUCOSE BLD STRIP.AUTO-MCNC: 98 MG/DL (ref 65–100)
GLUCOSE SERPL-MCNC: 90 MG/DL (ref 65–100)
POTASSIUM SERPL-SCNC: 3.4 MMOL/L (ref 3.5–5.1)
SERVICE CMNT-IMP: ABNORMAL
SERVICE CMNT-IMP: ABNORMAL
SERVICE CMNT-IMP: NORMAL
SERVICE CMNT-IMP: NORMAL
SODIUM SERPL-SCNC: 136 MMOL/L (ref 136–145)

## 2020-08-28 PROCEDURE — B2111ZZ FLUOROSCOPY OF MULTIPLE CORONARY ARTERIES USING LOW OSMOLAR CONTRAST: ICD-10-PCS | Performed by: INTERNAL MEDICINE

## 2020-08-28 PROCEDURE — 74011250637 HC RX REV CODE- 250/637: Performed by: INTERNAL MEDICINE

## 2020-08-28 PROCEDURE — 74011000636 HC RX REV CODE- 636: Performed by: INTERNAL MEDICINE

## 2020-08-28 PROCEDURE — 99152 MOD SED SAME PHYS/QHP 5/>YRS: CPT | Performed by: INTERNAL MEDICINE

## 2020-08-28 PROCEDURE — 74011250636 HC RX REV CODE- 250/636: Performed by: INTERNAL MEDICINE

## 2020-08-28 PROCEDURE — 77030013744: Performed by: INTERNAL MEDICINE

## 2020-08-28 PROCEDURE — 93458 L HRT ARTERY/VENTRICLE ANGIO: CPT | Performed by: INTERNAL MEDICINE

## 2020-08-28 PROCEDURE — 74011000250 HC RX REV CODE- 250: Performed by: INTERNAL MEDICINE

## 2020-08-28 PROCEDURE — B2151ZZ FLUOROSCOPY OF LEFT HEART USING LOW OSMOLAR CONTRAST: ICD-10-PCS | Performed by: INTERNAL MEDICINE

## 2020-08-28 PROCEDURE — 65660000000 HC RM CCU STEPDOWN

## 2020-08-28 PROCEDURE — 80048 BASIC METABOLIC PNL TOTAL CA: CPT

## 2020-08-28 PROCEDURE — 77030004532 HC CATH ANGI DX IMP BSC -A: Performed by: INTERNAL MEDICINE

## 2020-08-28 PROCEDURE — 99153 MOD SED SAME PHYS/QHP EA: CPT | Performed by: INTERNAL MEDICINE

## 2020-08-28 PROCEDURE — 36415 COLL VENOUS BLD VENIPUNCTURE: CPT

## 2020-08-28 PROCEDURE — 77030019569 HC BND COMPR RAD TERU -B: Performed by: INTERNAL MEDICINE

## 2020-08-28 PROCEDURE — C1769 GUIDE WIRE: HCPCS | Performed by: INTERNAL MEDICINE

## 2020-08-28 PROCEDURE — C1894 INTRO/SHEATH, NON-LASER: HCPCS | Performed by: INTERNAL MEDICINE

## 2020-08-28 PROCEDURE — 82962 GLUCOSE BLOOD TEST: CPT

## 2020-08-28 PROCEDURE — 4A023N7 MEASUREMENT OF CARDIAC SAMPLING AND PRESSURE, LEFT HEART, PERCUTANEOUS APPROACH: ICD-10-PCS | Performed by: INTERNAL MEDICINE

## 2020-08-28 RX ORDER — FUROSEMIDE 10 MG/ML
60 INJECTION INTRAMUSCULAR; INTRAVENOUS DAILY
Status: DISCONTINUED | OUTPATIENT
Start: 2020-08-28 | End: 2020-08-30

## 2020-08-28 RX ORDER — MIDAZOLAM HYDROCHLORIDE 1 MG/ML
INJECTION, SOLUTION INTRAMUSCULAR; INTRAVENOUS AS NEEDED
Status: DISCONTINUED | OUTPATIENT
Start: 2020-08-28 | End: 2020-08-28 | Stop reason: HOSPADM

## 2020-08-28 RX ORDER — FENTANYL CITRATE 50 UG/ML
INJECTION, SOLUTION INTRAMUSCULAR; INTRAVENOUS AS NEEDED
Status: DISCONTINUED | OUTPATIENT
Start: 2020-08-28 | End: 2020-08-28 | Stop reason: HOSPADM

## 2020-08-28 RX ORDER — VERAPAMIL HYDROCHLORIDE 2.5 MG/ML
INJECTION, SOLUTION INTRAVENOUS AS NEEDED
Status: DISCONTINUED | OUTPATIENT
Start: 2020-08-28 | End: 2020-08-28 | Stop reason: HOSPADM

## 2020-08-28 RX ORDER — HEPARIN SODIUM 200 [USP'U]/100ML
INJECTION, SOLUTION INTRAVENOUS
Status: COMPLETED | OUTPATIENT
Start: 2020-08-28 | End: 2020-08-28

## 2020-08-28 RX ORDER — SPIRONOLACTONE 25 MG/1
25 TABLET ORAL DAILY
Status: DISCONTINUED | OUTPATIENT
Start: 2020-08-28 | End: 2020-08-30 | Stop reason: HOSPADM

## 2020-08-28 RX ORDER — HEPARIN SODIUM 1000 [USP'U]/ML
INJECTION, SOLUTION INTRAVENOUS; SUBCUTANEOUS AS NEEDED
Status: DISCONTINUED | OUTPATIENT
Start: 2020-08-28 | End: 2020-08-28 | Stop reason: HOSPADM

## 2020-08-28 RX ORDER — LIDOCAINE HYDROCHLORIDE 10 MG/ML
INJECTION INFILTRATION; PERINEURAL AS NEEDED
Status: DISCONTINUED | OUTPATIENT
Start: 2020-08-28 | End: 2020-08-28 | Stop reason: HOSPADM

## 2020-08-28 RX ORDER — LOSARTAN POTASSIUM 50 MG/1
50 TABLET ORAL DAILY
Status: DISCONTINUED | OUTPATIENT
Start: 2020-08-28 | End: 2020-08-30 | Stop reason: HOSPADM

## 2020-08-28 RX ADMIN — Medication 10 ML: at 07:00

## 2020-08-28 RX ADMIN — METHADONE HYDROCHLORIDE 40 MG: 10 TABLET ORAL at 21:56

## 2020-08-28 RX ADMIN — Medication 10 ML: at 14:51

## 2020-08-28 RX ADMIN — FUROSEMIDE 60 MG: 10 INJECTION, SOLUTION INTRAMUSCULAR; INTRAVENOUS at 09:50

## 2020-08-28 RX ADMIN — Medication 10 ML: at 21:57

## 2020-08-28 RX ADMIN — METOPROLOL TARTRATE 50 MG: 50 TABLET, FILM COATED ORAL at 21:56

## 2020-08-28 NOTE — PROGRESS NOTES
Pt down for cardiac cath. Pt back from cardiac cath. ,noncomplaint with bp cuff remaining on for post cath vitals. Pt non -compliant with staying in bed, found pt walking around room. Pt educated to stay seated and leave bp cuff on so I can monitor his vitals. Pt stated, \"You are stressing me out and pissing me off. \" Attempted to remove 3ml of air from cath- oozing observed, added air back in. Passed info onto night shift RN Nayeli Orellana Bedside and Verbal shift change report given to [address] [place] [address] (oncoming nurse) by Darlin Sarmiento (offgoing nurse). Report included the following information SBAR, Kardex, Intake/Output, MAR, Accordion and Recent Results. Problem: Falls - Risk of  Goal: *Absence of Falls  Description: Document Rama Dang Fall Risk and appropriate interventions in the flowsheet. Outcome: Progressing Towards Goal  Note: Fall Risk Interventions:            Medication Interventions: Evaluate medications/consider consulting pharmacy, Patient to call before getting OOB, Teach patient to arise slowly, Utilize gait belt for transfers/ambulation    Elimination Interventions: Call light in reach, Patient to call for help with toileting needs, Stay With Me (per policy), Toilet paper/wipes in reach, Urinal in reach              Problem: Patient Education: Go to Patient Education Activity  Goal: Patient/Family Education  Outcome: Progressing Towards Goal     Problem: Patient Education: Go to Patient Education Activity  Goal: Patient/Family Education  Outcome: Progressing Towards Goal     Problem: Afib: Discharge Outcomes (not in CAT)  Goal: *Lungs clear or at baseline  Outcome: Progressing Towards Goal  Goal: *Optimal pain control at patient's stated goal  Outcome: Progressing Towards Goal     Problem: Diabetes Self-Management  Goal: *Disease process and treatment process  Description: Define diabetes and identify own type of diabetes; list 3 options for treating diabetes.   Outcome: Progressing Towards Goal     Problem: Patient Education: Go to Patient Education Activity  Goal: Patient/Family Education  Outcome: Progressing Towards Goal

## 2020-08-28 NOTE — DIABETES MGMT
[facility]ES HE[facility]    FOLLOW UP NOTE    Presentation   Natanael Arroyo is a 63 y.o. male admitted  with SOB on exertion and bilateral lower extremity edema with redness on lower legs. Work up evaluation revealed atrial fibrillation with RVR/ elevated BNP and CXR with cardiomegaly. Was converted in to NSR yesterday and had NNEKA to evaluate cardiomegaly. Will be going for cardiac ultrasound today. HX: PMH: HTN/ DM2-A1C 7.6%  . Diabetes: Patient has known Type 2 diabetes, treated with Metformin PTA. Family history positive  for diabetes. Consulted by Provider for advanced diabetes nursing assessment and care, specifically related to   [] Transitioning off Jonna Fairlee   [x] Inpatient management strategy  [x] Home management assessment  [x] Survival skill education    Diabetes-related medical history  Acute complications  None  Neurological complications  Peripheral neuropathy  Microvascular disease  NONE  Macrovascular disease  NONE  Other associated conditions     HTN/     Diabetes medication history  Drug class Currently in use Discontinued Never used   Biguanide Metformin 500mg -2tabs twice daily     DDP-4 inhibitor       Sulfonylurea      Thiazolidinedione      GLP-1 RA      SGLT-2 inhibitors      Basal insulin      Fixed Dose  Combinations      Bolus insulin        Subjective   Mr. Maddi Roman is a pleasant man who has had DM2 for a year. He has been taking Metformin daily and states he checks his BG once a day. He states he is followed by his PCP (Dr. Cassandra Duffy)  for his diabetes management. 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 He denies ETOH abuse and has not taken a drink in over 10yrs. He admits to \"sniffing\" heroin in the past but has been clean for 2yrs and takes Methadone. He states he's embarassed and ashamed and doesn't like a lot of people to know.     Patient reports the following home diabetes self-care practices:  Eating pattern  [] Breakfast Piper/egg; boiled eggs w/toast x2  [] Lunch  Skips  [] Dinner  Frozen dinners- \"I don't cook\"  [] Snacks Oatmeal cakes/ Bharati Q's cakes-stopped eating those two months ago. [] Beverages Water  Physical activity pattern-ambulates freely, no exercise regimen verbalized. Works outside mostly    Monitoring pattern- only checks once a day- 150-160s BG average. Taking medications pattern  [x] Consistent administration  [x] Affordable    Social determinants of health impacting diabetes self-management practices   Concerned that you need to know more about how to stay healthy with diabetes  Objective   Physical exam  General Alert, oriented and in no acute distress/ill-appearing. Conversant and cooperative. Vital Signs   Visit Vitals  BP (!) 155/98   Pulse 62   Temp 97.7 °F (36.5 °C)   Resp 13   Ht 5' 10\" (1.778 m)   Wt 157 kg (346 lb 2 oz)   SpO2 95%   BMI 49.66 kg/m²     Skin  Warm and dry  Heart   Regular rate and rhythm. No murmurs, rubs or gallops  Lungs  Clear to auscultation without rales or rhonchi  Extremities No foot wounds    Diabetic foot exam:    Left Foot     Visual Exam: +2 edema noted;    Pulse DP: 2+ (normal)   Filament test: reduced sensation      Right Foot   Visual Exam: no ulcers, dressing in place by wound care on front of foot -\"to cover the spot where fluid was coming out\"    Pulse DP: 2+ (normal)   Filament test: normal sensation     DP & PT pulses +2.      Laboratory  Lab Results   Component Value Date/Time    Hemoglobin A1c 7.6 (H) 08/26/2020 04:39 AM     Lab Results   Component Value Date/Time    LDL, calculated 46.6 08/26/2020 04:39 AM     Lab Results   Component Value Date/Time    Creatinine 0.86 08/28/2020 04:34 AM     Lab Results   Component Value Date/Time    Sodium 136 08/28/2020 04:34 AM    Potassium 3.4 (L) 08/28/2020 04:34 AM    Chloride 100 08/28/2020 04:34 AM    CO2 30 08/28/2020 04:34 AM    Anion gap 6 08/28/2020 04:34 AM    Glucose 90 08/28/2020 04:34 AM    BUN 23 (H) 08/28/2020 04:34 AM    Creatinine 0.86 08/28/2020 04:34 AM    BUN/Creatinine ratio 27 (H) 08/28/2020 04:34 AM    GFR est AA >60 08/28/2020 04:34 AM    GFR est non-AA >60 08/28/2020 04:34 AM    Calcium 8.0 (L) 08/28/2020 04:34 AM    Bilirubin, total 1.4 (H) 08/24/2020 08:24 AM    Alk. phosphatase 108 08/24/2020 08:24 AM    Protein, total 8.0 08/24/2020 08:24 AM    Albumin 3.1 (L) 08/24/2020 08:24 AM    Globulin 4.9 (H) 08/24/2020 08:24 AM    A-G Ratio 0.6 (L) 08/24/2020 08:24 AM    ALT (SGPT) 30 08/24/2020 08:24 AM     Lab Results   Component Value Date/Time    ALT (SGPT) 30 08/24/2020 08:24 AM       Factors affecting BG pattern  Factor Dose Comments   Nutrition:  Carb-controlled meals     60 grams/meal      Drugs:  Other: amiodarone             Blood glucose pattern        Assessment and Plan   Nursing Diagnosis Risk for unstable blood glucose pattern   Nursing Intervention Domain 5250 Decision-making Support   Nursing Interventions Examined current inpatient diabetes control   Explored factors facilitating and impeding inpatient management  Identified self-management practices impeding diabetes control  Explored corrective strategies with patient and responsible inpatient provider   Informed patient of rational for insulin strategy while hospitalized  Instructed patient in importance of checking BG three times daily; monitoring diet/carb intake. Evaluation   Mr. Tomas Cabrales with Type 2 diabetes, has achieved inpatient blood glucose target of 100-180mg/dl. NPO today for cardiac cath. BG trends have been within target range. Continue with correctional insulin. Inpatient blood glucose management has been impacted by  [] Kidney dysfunction  [x] Erratic meal consumption -intermittent NPO status for testing   [] Glucocorticoid use  []  Pressor support    Recommendations   1.  IF BG trends >200mg/dl, consider INITIATING :    Low dose Basal insulin   [x] 0.1 units/kg/D =15units daily      2. CONTINUE Corrective insulin    [x] Insulin-resistant sensitivity (BMI >27)    3. On Discharge, please place an order for \"diabetes education\". This will trigger a referral for the [facility] Diabetes [facility] which includes outpatient education with a certified diabetes educator. 4. Before discharge will provide additional diet/exercise/ and overall diabetes education.- The following education was  Provided :    Diabetes Education Checklist  Diagnostic Criteria  [x] Hemoglobin A1c  [x] Blood glucose goals  Blood Glucose Monitoring  [x] Using a glucometer- Encouraged to check BG three times daily BEFORE meals and log results  [x] When to check BG  [x] Record keeping      Hypoglycemia  [x] Signs & symptoms  Hyperglycemia  [x] Signs & symptoms  [x] Prevention & troubleshooting  :   Problem Solving  [x] When to call your doctor   [x] Endocrine vs PCP  Physical Activity & Exercise  [x] Review of current routine  [x] Impact on BG levels    Nutrition Basics  [x] Starter tips for meal planning  [x] Meal & snack schedule  [x] Reading food labels  [x] Balanced plate method  [] How different foods impact BG levels  [] Carbohydrate food sources  [x] Carbohydrate counting        [x] Low carb meal & snack options        [] Goals for carb amount with sliding scale         [] Carb counting w/ intensive insulin dosing     Reducing Risks  [x] Long-term complications of diabetes  [x] Health Maintenance    Diabetes Technology  [x] CGM -he is interested in this    Billing Code(s)   I personally reviewed chart, notes, data and current medications in the medical record, and examined the patient at bedside before making care recommendations. Thank you for including us in their care. I spent 20 minutes in direct patient care today for this patient.   Time includes chart review, face to face with patient and collaboration with interdisciplinary care team.     Johny Mendoza, [facility]  [facility]  Access via [address] [place] [address]  [phone]

## 2020-08-28 NOTE — PROGRESS NOTES
Cardiac Cath Lab Recovery Arrival Note:      Miranda Cardona arrived to Cardiac Cath Lab, Recovery Area. Staff introduced to patient. Patient identifiers verified with NAME and DATE OF BIRTH. Procedure verified with patient. Consent forms reviewed and signed by patient or authorized representative and verified. Allergies verified. Patient and family oriented to department. Patient and family informed of procedure and plan of care. Questions answered with review. Patient prepped for procedure, per orders from physician, prior to arrival.    Patient on cardiac monitor, non-invasive blood pressure, SPO2 monitor. On RA. Patient is A&Ox 4. Patient reports no c/o's. Patient in stretcher, in low position, with side rails up, call bell within reach, patient instructed to call if assistance as needed. Patient prep in: [address], [place] [address]. Patient family has pager # n/a  Family in: n/a. Prep by: CHELI Ferrari

## 2020-08-28 NOTE — PROGRESS NOTES
Verbal report given to Naima(name) on NAME  being transferred to [facility]) for routine progression of care       Report consisted of patients Situation, Background, Assessment and   Recommendations(SBAR). Information from the following report(s) Procedure Summary, MAR and Recent Results was reviewed with the receiving nurse. Lines:       Opportunity for questions and clarification was provided.       Patient transported with:   Monitor  Registered Nurse

## 2020-08-28 NOTE — NURSE NAVIGATOR
Follow up scheduled with [facility] with Dr. Maxine Tran for 9/1/20 at 12:30pm.  Information on After Visit Summary.

## 2020-08-28 NOTE — PROCEDURES
Cardiac Catheterization Procedure Note   Patient: Magdalena Blue  MRN: [id number]  SSN: [id number]   YOB: 1965 Age: 63 y.o.   Sex: male    Date of Procedure: 8/28/2020   Pre-procedure Diagnosis: Congestive Heart Failure  Post-procedure Diagnosis: Congestive Heart Failure  Procedure: Left Heart Cath, Cors, LV gram, moderate sedation  :  Dr. Royer العراقي MD    Assistant(s):  None  Anesthesia: Moderate Sedation   Estimated Blood Loss: Less than 10 mL   Specimens Removed: None  Findings: No significant CAD, LVEF 35%, moderately elevated LVEDP  Complications: None   Implants:  None  Signed by:  Royer العراقي MD  8/28/2020  5:02 PM

## 2020-08-28 NOTE — PROGRESS NOTES
Cardiology Progress Note  2020     Admit Date: 2020  Admit Diagnosis: Atrial fibrillation with RVR ([facility] [address] [I48.91]  CC: none currently    Assessment:   Active Problems:    Atrial fibrillation with RVR ([address] (2020)      Plan:     Remains in NSR after DCCV, stopped amiodarone given QTc interaction with methadone  TTE showed improved but still reduced LVEF  Cont current cardiac meds  Daily lytes  [facility] today    Subjective:      Kolton Cardona remains in NSR, cont to improve    Objective:    Physical Exam:  Overall VSSAF;    Visit Vitals  /88 (BP 1 Location: Right arm, BP Patient Position: At rest)   Pulse 66   Temp 97.7 °F (36.5 °C)   Resp 20   Ht 5' 10\" (1.778 m)   Wt 157 kg (346 lb 2 oz)   SpO2 98%   BMI 49.66 kg/m²     Temp (24hrs), Av.8 °F (36.6 °C), Min:97.6 °F (36.4 °C), Max:98.3 °F (36.8 °C)    Patient Vitals for the past 8 hrs:   Pulse   20 1540 66   20 1156 62   20 0950 62    Patient Vitals for the past 8 hrs:   Resp   20 1540 20   20 1156 17    Patient Vitals for the past 8 hrs:   BP   20 1540 130/88   20 1156 141/87   20 0950 (!) 155/98       190 -  0700  In: 432.3 [I.V.:432.3]  Out: 1325 [Urine:1325]      General Appearance: Well developed, well nourished, no acute distress. Ears/Nose/Mouth/Throat:   Normal MM; anicteric. JVP: WNL   Resp:   Lungs clear to auscultation bilaterally. Nl resp effort. Cardiovascular:  RRR   Abdomen:   Soft, non-tender, bowel sounds are present. Extremities: No edema bilaterally. Skin:  Neuro: Warm and dry.   A/O x3, grossly nonfocal                         Data Review:     Telemetry independently reviewed :   NSR       ECG independently reviewed: NSR  Labs:   Recent Results (from the past 24 hour(s))   GLUCOSE, POC    Collection Time: 20  4:41 PM   Result Value Ref Range    Glucose (POC) 122 (H) 65 - 100 mg/dL    Performed by Vinay WHITAKER    GLUCOSE, POC    Collection Time: 08/27/20  9:15 PM   Result Value Ref Range    Glucose (POC) 140 (H) 65 - 100 mg/dL    Performed by Carmenza Soliz RN    METABOLIC PANEL, BASIC    Collection Time: 08/28/20  4:34 AM   Result Value Ref Range    Sodium 136 136 - 145 mmol/L    Potassium 3.4 (L) 3.5 - 5.1 mmol/L    Chloride 100 97 - 108 mmol/L    CO2 30 21 - 32 mmol/L    Anion gap 6 5 - 15 mmol/L    Glucose 90 65 - 100 mg/dL    BUN 23 (H) 6 - 20 MG/DL    Creatinine 0.86 0.70 - 1.30 MG/DL    BUN/Creatinine ratio 27 (H) 12 - 20      GFR est AA >60 >60 ml/min/1.73m2    GFR est non-AA >60 >60 ml/min/1.73m2    Calcium 8.0 (L) 8.5 - 10.1 MG/DL   GLUCOSE, POC    Collection Time: 08/28/20  8:40 AM   Result Value Ref Range    Glucose (POC) 117 (H) 65 - 100 mg/dL    Performed by Davis Melgar    GLUCOSE, POC    Collection Time: 08/28/20 12:38 PM   Result Value Ref Range    Glucose (POC) 98 65 - 100 mg/dL    Performed by Davis Melgar       Current medications reviewed       Miesha Camarena MD

## 2020-08-28 NOTE — PROGRESS NOTES
Problem: Falls - Risk of  Goal: *Absence of Falls  Description: Document Patricia Turcios Fall Risk and appropriate interventions in the flowsheet. Outcome: Progressing Towards Goal  Note: Fall Risk Interventions:            Medication Interventions: Evaluate medications/consider consulting pharmacy, Teach patient to arise slowly    Elimination Interventions: Call light in reach, Toileting schedule/hourly rounds, Urinal in reach    Pt had no falls throughout shift. Problem: Patient Education: Go to Patient Education Activity  Goal: Patient/Family Education  Outcome: Progressing Towards Goal     Problem: Afib Pathway: Day 1  Goal: Medications  Outcome: Progressing Towards Goal  Goal: *Hemodynamically stable  Outcome: Progressing Towards Goal  Goal: *Stable cardiac rhythm  Outcome: Progressing Towards Goal  Goal: *Lungs clear or at baseline  Outcome: Progressing Towards Goal     Problem: Diabetes Self-Management  Goal: *Disease process and treatment process  Description: Define diabetes and identify own type of diabetes; list 3 options for treating diabetes. Outcome: Progressing Towards Goal  Goal: *Incorporating nutritional management into lifestyle  Description: Describe effect of type, amount and timing of food on blood glucose; list 3 methods for planning meals. Outcome: Progressing Towards Goal  Goal: *Using medications safely  Description: State effect of diabetes medications on diabetes; name diabetes medication taking, action and side effects. Outcome: Progressing Towards Goal  Goal: *Monitoring blood glucose, interpreting and using results  Description: Identify recommended blood glucose targets  and personal targets.   Outcome: Progressing Towards Goal     Last 3 Recorded Weights in this Encounter    08/27/20 0400 08/27/20 1520 08/28/20 0400   Weight: 151.7 kg (334 lb 7 oz) 151.5 kg (334 lb) 157 kg (346 lb 2 oz)     Bedside shift change report given to Kerri Meza RN (oncoming nurse) by Amelia Pleitez RN (offgoing nurse). Report included the following information SBAR, Kardex, ED Summary, Procedure Summary, Intake/Output, MAR, Accordion, Recent Results, Med Rec Status, Cardiac Rhythm Sinus lee ann and Alarm Parameters .

## 2020-08-28 NOTE — PROGRESS NOTES
TRANSFER - IN REPORT:    Verbal report received from Lambert on Aleyda Cardona  being received from procedural area for routine progression of care. Report consisted of patients Situation, Background, Assessment and Recommendations(SBAR). Information from the following report(s) Procedure Summary, MAR and Recent Results was reviewed with the receiving clinician. Opportunity for questions and clarification was provided. Assessment completed upon patients arrival to [address] [place] [address] and care assumed. Cardiac Cath Lab Recovery Arrival Note:    Aleyda Cardona arrived to [facility] recovery area. Patient procedure= LHC. Patient on cardiac monitor, non-invasive blood pressure, SPO2 monitor. On  or O2 @ 2 lpm via NC.  IV  of NS on pump at 25 ml/hr. Patient status doing well without problems. Patient is A&Ox 3. Patient reports no c/o's. PROCEDURE SITE CHECK:    Procedure site:without any bleeding and hematoma, no pain/discomfort reported at procedure site. No change in patient status. Continue to monitor patient and status.

## 2020-08-29 LAB
ANION GAP SERPL CALC-SCNC: 6 MMOL/L (ref 5–15)
BUN SERPL-MCNC: 19 MG/DL (ref 6–20)
BUN/CREAT SERPL: 24 (ref 12–20)
CALCIUM SERPL-MCNC: 7.5 MG/DL (ref 8.5–10.1)
CHLORIDE SERPL-SCNC: 103 MMOL/L (ref 97–108)
CO2 SERPL-SCNC: 30 MMOL/L (ref 21–32)
CREAT SERPL-MCNC: 0.79 MG/DL (ref 0.7–1.3)
GLUCOSE BLD STRIP.AUTO-MCNC: 105 MG/DL (ref 65–100)
GLUCOSE BLD STRIP.AUTO-MCNC: 114 MG/DL (ref 65–100)
GLUCOSE BLD STRIP.AUTO-MCNC: 134 MG/DL (ref 65–100)
GLUCOSE BLD STRIP.AUTO-MCNC: 141 MG/DL (ref 65–100)
GLUCOSE SERPL-MCNC: 125 MG/DL (ref 65–100)
POTASSIUM SERPL-SCNC: 3.9 MMOL/L (ref 3.5–5.1)
SERVICE CMNT-IMP: ABNORMAL
SODIUM SERPL-SCNC: 139 MMOL/L (ref 136–145)

## 2020-08-29 PROCEDURE — 74011250636 HC RX REV CODE- 250/636: Performed by: INTERNAL MEDICINE

## 2020-08-29 PROCEDURE — 65660000000 HC RM CCU STEPDOWN

## 2020-08-29 PROCEDURE — 74011250637 HC RX REV CODE- 250/637: Performed by: INTERNAL MEDICINE

## 2020-08-29 PROCEDURE — 74011250636 HC RX REV CODE- 250/636: Performed by: FAMILY MEDICINE

## 2020-08-29 PROCEDURE — 36415 COLL VENOUS BLD VENIPUNCTURE: CPT

## 2020-08-29 PROCEDURE — 74011250637 HC RX REV CODE- 250/637: Performed by: FAMILY MEDICINE

## 2020-08-29 PROCEDURE — 80048 BASIC METABOLIC PNL TOTAL CA: CPT

## 2020-08-29 PROCEDURE — 82962 GLUCOSE BLOOD TEST: CPT

## 2020-08-29 PROCEDURE — 74011636637 HC RX REV CODE- 636/637: Performed by: FAMILY MEDICINE

## 2020-08-29 RX ORDER — VANCOMYCIN 2 GRAM/500 ML IN 0.9 % SODIUM CHLORIDE INTRAVENOUS
2000 EVERY 12 HOURS
Status: DISCONTINUED | OUTPATIENT
Start: 2020-08-30 | End: 2020-08-30 | Stop reason: HOSPADM

## 2020-08-29 RX ORDER — VANCOMYCIN 2 GRAM/500 ML IN 0.9 % SODIUM CHLORIDE INTRAVENOUS
2000 ONCE
Status: COMPLETED | OUTPATIENT
Start: 2020-08-29 | End: 2020-08-29

## 2020-08-29 RX ADMIN — VANCOMYCIN HYDROCHLORIDE 2000 MG: 10 INJECTION, POWDER, LYOPHILIZED, FOR SOLUTION INTRAVENOUS at 12:52

## 2020-08-29 RX ADMIN — INSULIN LISPRO 2 UNITS: 100 INJECTION, SOLUTION INTRAVENOUS; SUBCUTANEOUS at 08:29

## 2020-08-29 RX ADMIN — LOSARTAN POTASSIUM 50 MG: 50 TABLET, FILM COATED ORAL at 08:29

## 2020-08-29 RX ADMIN — METOPROLOL TARTRATE 50 MG: 50 TABLET, FILM COATED ORAL at 21:22

## 2020-08-29 RX ADMIN — METHADONE HYDROCHLORIDE 40 MG: 10 TABLET ORAL at 21:22

## 2020-08-29 RX ADMIN — APIXABAN 5 MG: 5 TABLET, FILM COATED ORAL at 17:22

## 2020-08-29 RX ADMIN — Medication 10 ML: at 15:46

## 2020-08-29 RX ADMIN — ENOXAPARIN SODIUM 160 MG: 80 INJECTION SUBCUTANEOUS at 08:28

## 2020-08-29 RX ADMIN — APIXABAN 5 MG: 5 TABLET, FILM COATED ORAL at 12:02

## 2020-08-29 RX ADMIN — METOPROLOL TARTRATE 50 MG: 50 TABLET, FILM COATED ORAL at 08:29

## 2020-08-29 RX ADMIN — Medication 10 ML: at 07:24

## 2020-08-29 RX ADMIN — LACTULOSE 45 ML: 20 SOLUTION ORAL at 12:03

## 2020-08-29 RX ADMIN — SPIRONOLACTONE 25 MG: 25 TABLET ORAL at 08:29

## 2020-08-29 RX ADMIN — Medication 10 ML: at 21:24

## 2020-08-29 RX ADMIN — FUROSEMIDE 60 MG: 10 INJECTION, SOLUTION INTRAMUSCULAR; INTRAVENOUS at 08:29

## 2020-08-29 NOTE — PROGRESS NOTES
Bedside shift change report given to Elis Frost (oncoming nurse) by Alecia Urirate (offgoing nurse). Report included the following information SBAR, Kardex, MAR, Recent Results, Med Rec Status and Cardiac Rhythm NSR, sinus lee ann. Problem: Afib: Discharge Outcome  Goal: Stable cardiac rhythm, lungs clear at baseline  Outcome: Progressing Towards Goal  Note- stable cardiac rhythm, NSR/sinus lee ann    Problem: Heart Failure: Discharge Outcomes  Goal: Demonstrates ability to perform prescribed activity without shortness of breath or discomfort  Outcome: Progressing Towards Goal  Note- O2 sats within defined limits on RA.  Dyspnea on exertion

## 2020-08-29 NOTE — PROGRESS NOTES
Pharmacist Note - Vancomycin Dosing    Consult provided for this 63 y.o. male for indication of bilateral lower extremity cellulitis. Note: patient admitted 20 with lower extremity swelling and shortness of breath, was given Lasix and antibiotics outpatient PTA for LE swelling/redness but did not improve. No antibiotics given since admission, treating from cardiology standpoint and just monitoring LE redness/cellulitis. No fever, no elevated WBC, etc.   Antibiotic regimen(s): Vancomycin monotherapy  Patient on vancomycin PTA? NO     Recent Labs     20  0454 20  0434 20  0216   CREA 0.79 0.86 1.03   BUN 19 23* 24*     Frequency of BMP: Ordered daily x 3  Height: 177.8 cm  Weight: 154.5 kg  Est CrCl: >120 ml/min; UO: (unmeasured) ml/kg/hr  Temp (24hrs), Av.5 °F (36.9 °C), Min:98 °F (36.7 °C), Max:99.1 °F (37.3 °C)    Cultures: none    Goal trough = 10 - 15 mcg/mL    Plan: Therapy will be initiated with a loading dose of 2000 mg IV x 1 to be followed by a maintenance dose of 2000 mg IV every 12 hours. Pharmacy to follow patient daily and order levels / make dose adjustments as appropriate.

## 2020-08-29 NOTE — PROGRESS NOTES
[address] [facility] Adult  Hospitalist Group                                                                                          Hospitalist Progress Note  Jesús Jean MD  Answering service: [phone] OR [phone] from in house phone        NAME:  Yoseph Melvin  :  1965  MRN:  [id number]      Admission Summary:   Yoseph Melvin is a 63 y.o. male who presents for evaluation of shortness of breath and leg swelling. Patient has been having swelling in the legs right more than left along with shortness of breath mainly on exertion. He denies any significant orthopnea but he states he always sleeps propped up. He denies any chest pain or palpitation. He was seen by his PCP 2 days ago and was prescribed Lasix and antibiotic for redness in his legs. But his symptoms not significantly improved and therefore presented to the emergency room. Patient was noted to have atrial fibrillation with rapid ventricular response. Also has elevated BNP and chest x-ray showing cardiomegaly with early interstitial edema. Patient has no known history of atrial fibrillation. Patient was started on Cardizem drip and hospitalist service requested to admit the patient for further evaluation management. Interval history / Subjective:   Patient seen and examined today. he is feeling ok and is in NSR after cardioversion   Plans for him to go home soon  COVID neg     Assessment & Plan:     1. Atrial fibrillation with rapid ventricular response  -New onset atrial fibrillation with RVR- cardioverted   Cardiology adjusting cardiac meds     2. Acute heart failure type unknown.  -cont IV Lasix, daily weights, monitor I&Os  - echocardiogram showing EF 35-40%- previous was normal  Patient taken to the cath lab 20- normal coronaries     3.  Hypertension  -stable BP on current meds      4 Diabetes  -Insulin sliding scale coverage  Accu-Cheks  Hemoglobin A1c 7.6      5. bilat LE cellulitis- started IV vanc today    Code status: full  DVT prophylaxis: scd    Care Plan discussed with: Patient/Family  Anticipated Disposition: Home w/Family  Anticipated Discharge: Less than 24 hours     Hospital Problems  Never Reviewed          Codes Class Noted POA    Atrial fibrillation with RVR [facility]) ICD-10-CM: I48.91  ICD-9-CM: 427.31  8/24/2020 Unknown                Review of Systems:   A comprehensive review of systems was negative except for that written in the HPI. Vital Signs:    Last 24hrs VS reviewed since prior progress note. Most recent are:  Visit Vitals  /78 (BP 1 Location: Right arm, BP Patient Position: At rest)   Pulse 60   Temp 98 °F (36.7 °C)   Resp 17   Ht 5' 10\" (1.778 m)   Wt 157.5 kg (347 lb 4.8 oz)   SpO2 96%   BMI 49.83 kg/m²       No intake or output data in the 24 hours ending 08/29/20 1750     Physical Examination:             Constitutional:  No acute distress, cooperative, pleasant    ENT:  Oral mucosa dry   Resp:  decreased breath sounds bilaterally. No wheezing; crackles in bases   CV:  RRR 3/6 SE murmurs    GI:  Soft,  distended, non tender. Musculoskeletal:  pitting edema in bilat LE,     Neurologic:  Moves all extremities. AAOx3, CN II-XII reviewed     Skin: significant erythema on bilat LE consistent with cellulitis       Data Review:    Reviewed and/or order of clinical lab test      Labs:     No results for input(s): WBC, HGB, HCT, PLT, HGBEXT, HCTEXT, PLTEXT, HGBEXT, HCTEXT, PLTEXT in the last 72 hours. Recent Labs     08/29/20  0454 08/28/20  0434 08/27/20  0216    136 134*   K 3.9 3.4* 4.0    100 99   CO2 30 30 30   BUN 19 23* 24*   CREA 0.79 0.86 1.03   * 90 125*   CA 7.5* 8.0* 8.4*   MG  --   --  2.2     No results for input(s): ALT, AP, TBIL, TBILI, TP, ALB, GLOB, GGT, AML, LPSE in the last 72 hours. No lab exists for component: SGOT, GPT, AMYP, HLPSE  No results for input(s): INR, PTP, APTT, INREXT, INREXT in the last 72 hours.    No results for input(s): FE, TIBC, PSAT, FERR in the last 72 hours. No results found for: FOL, RBCF   No results for input(s): PH, PCO2, PO2 in the last 72 hours. No results for input(s): CPK, CKNDX, TROIQ in the last 72 hours. No lab exists for component: CPKMB  Lab Results   Component Value Date/Time    Cholesterol, total 76 08/26/2020 04:39 AM    HDL Cholesterol 15 08/26/2020 04:39 AM    LDL, calculated 46.6 08/26/2020 04:39 AM    Triglyceride 72 08/26/2020 04:39 AM    CHOL/HDL Ratio 5.1 (H) 08/26/2020 04:39 AM     Lab Results   Component Value Date/Time    Glucose (POC) 114 (H) 08/29/2020 05:13 PM    Glucose (POC) 134 (H) 08/29/2020 11:51 AM    Glucose (POC) 141 (H) 08/29/2020 08:08 AM    Glucose (POC) 166 (H) 08/28/2020 09:09 PM    Glucose (POC) 78 08/28/2020 06:17 PM     Lab Results   Component Value Date/Time    Color YELLOW/STRAW 08/24/2020 08:24 AM    Appearance CLEAR 08/24/2020 08:24 AM    Specific gravity 1.009 08/24/2020 08:24 AM    pH (UA) 5.5 08/24/2020 08:24 AM    Protein Negative 08/24/2020 08:24 AM    Glucose Negative 08/24/2020 08:24 AM    Ketone Negative 08/24/2020 08:24 AM    Bilirubin Negative 08/24/2020 08:24 AM    Urobilinogen 2.0 (H) 08/24/2020 08:24 AM    Nitrites Negative 08/24/2020 08:24 AM    Leukocyte Esterase Negative 08/24/2020 08:24 AM    Epithelial cells FEW 08/24/2020 08:24 AM    Bacteria Negative 08/24/2020 08:24 AM    WBC 0-4 08/24/2020 08:24 AM    RBC 0-5 08/24/2020 08:24 AM     ECHO 8/27/20  Echo Findings     Left Ventricle  Left ventricle not well visualized. Upper normal wall thickness. The estimated EF is 35 - 40%. Moderately reduced systolic function. There is moderate (grade 2) left ventricular diastolic dysfunction. Left Atrium  Normal cavity size. Right Ventricle  Not well visualized. Right Atrium  Normal cavity size. Aortic Valve  Aortic valve not well visualized. No stenosis and no regurgitation. Mitral Valve  Mitral valve not well visualized.     Tricuspid Valve Tricuspid valve not well visualized. No stenosis. Mild regurgitation. Pulmonic Valve  Pulmonic valve not well visualized. Aorta  Normal aortic root. Pulmonary Artery  Pulmonary hypertension found to be moderate. Pericardium  No evidence of pericardial effusion.           Medications Reviewed:     Current Facility-Administered Medications   Medication Dose Route Frequency    apixaban (ELIQUIS) tablet 5 mg  5 mg Oral BID    Vancomycin - Pharmacy to dose   Other Rx Dosing/Monitoring    [START ON 8/30/2020] vancomycin (VANCOCIN) 2000 mg in  ml infusion  2,000 mg IntraVENous Q12H    losartan (COZAAR) tablet 50 mg  50 mg Oral DAILY    spironolactone (ALDACTONE) tablet 25 mg  25 mg Oral DAILY    furosemide (LASIX) injection 60 mg  60 mg IntraVENous DAILY    lactulose (CHRONULAC) 10 gram/15 mL solution 45 mL  30 g Oral BID PRN    metoprolol tartrate (LOPRESSOR) tablet 50 mg  50 mg Oral Q12H    methadone (DOLOPHINE) tablet 40 mg  40 mg Oral DAILY    sodium chloride (NS) flush 5-40 mL  5-40 mL IntraVENous Q8H    sodium chloride (NS) flush 5-40 mL  5-40 mL IntraVENous PRN    acetaminophen (TYLENOL) tablet 650 mg  650 mg Oral Q6H PRN    Or    acetaminophen (TYLENOL) suppository 650 mg  650 mg Rectal Q6H PRN    polyethylene glycol (MIRALAX) packet 17 g  17 g Oral DAILY PRN    promethazine (PHENERGAN) tablet 12.5 mg  12.5 mg Oral Q6H PRN    Or    ondansetron (ZOFRAN) injection 4 mg  4 mg IntraVENous Q6H PRN    insulin lispro (HUMALOG) injection   SubCUTAneous AC&HS    glucose chewable tablet 16 g  4 Tab Oral PRN    glucagon (GLUCAGEN) injection 1 mg  1 mg IntraMUSCular PRN    dextrose 10% infusion 0-250 mL  0-250 mL IntraVENous PRN     ______________________________________________________________________  EXPECTED LENGTH OF STAY: 2d 4h  ACTUAL LENGTH OF STAY:          5                 Ana Jones MD

## 2020-08-29 NOTE — PROGRESS NOTES
Problem: Afib: Discharge Outcomes (not in CAT)  Goal: *Stable cardiac rhythm  Outcome: Progressing Towards Goal  Pt continues to be in NSR throughout shift  Goal: *Identifies cardiac risk factors  Outcome: Progressing Towards Goal  Pt started on Eliquis today    1930 Bedside shift change report given to [facility] (oncoming nurse) by Isidro Gonzalez (offgoing nurse). Report included the following information SBAR, Kardex, MAR, Accordion, and Recent Results.

## 2020-08-29 NOTE — PROGRESS NOTES
[address] [facility] Adult  Hospitalist Group                                                                                          Hospitalist Progress Note  Everett Norton MD  Answering service: [phone] OR [phone] from in house phone        NAME:  Blake Kearns  :  1965  MRN:  [id number]      Admission Summary:   Blake Kearns is a 63 y.o. male who presents for evaluation of shortness of breath and leg swelling. Patient has been having swelling in the legs right more than left along with shortness of breath mainly on exertion. He denies any significant orthopnea but he states he always sleeps propped up. He denies any chest pain or palpitation. He was seen by his PCP 2 days ago and was prescribed Lasix and antibiotic for redness in his legs. But his symptoms not significantly improved and therefore presented to the emergency room. Patient was noted to have atrial fibrillation with rapid ventricular response. Also has elevated BNP and chest x-ray showing cardiomegaly with early interstitial edema. Patient has no known history of atrial fibrillation. Patient was started on Cardizem drip and hospitalist service requested to admit the patient for further evaluation management. Interval history / Subjective:   Patient seen and examined today. he is feeling ok and is in NSR after cardioversion   He is still short of breath but feeling better  COVID neg     Assessment & Plan:     1. Atrial fibrillation with rapid ventricular response  -New onset atrial fibrillation with RVR  Was on diltiazem drip  Transition to Lopressor. On Lovenox therapeutic dose  NNEKA with no clot   S/P DCCV ,  , successful   Pending  echocardiogram, cardiology consulted and following     2. Acute heart failure type unknown.  -cont IV Lasix  - echocardiogram ordered     3.  Hypertension  -stable BP on current meds      4 Diabetes  -Insulin sliding scale coverage  Accu-Cheks  Hemoglobin A1c 7.6      5. bilat LE cellulitis- monitor for now    Code status: full  DVT prophylaxis: scd    Care Plan discussed with: Patient/Family  Anticipated Disposition: Home w/Family  Anticipated Discharge: Less than 24 hours     Hospital Problems  Never Reviewed          Codes Class Noted POA    Atrial fibrillation with RVR [facility]) ICD-10-CM: I48.91  ICD-9-CM: 427.31  8/24/2020 Unknown                Review of Systems:   A comprehensive review of systems was negative except for that written in the HPI. Vital Signs:    Last 24hrs VS reviewed since prior progress note. Most recent are:  Visit Vitals  /72 (BP 1 Location: Left arm, BP Patient Position: At rest;Sitting)   Pulse 66   Temp 98.8 °F (37.1 °C)   Resp 18   Ht 5' 10\" (1.778 m)   Wt 157.5 kg (347 lb 4.8 oz)   SpO2 94%   BMI 49.83 kg/m²         Intake/Output Summary (Last 24 hours) at 8/29/2020 0734  Last data filed at 8/28/2020 1520  Gross per 24 hour   Intake 0 ml   Output    Net 0 ml        Physical Examination:             Constitutional:  No acute distress, cooperative, pleasant    ENT:  Oral mucosa dry   Resp:  decreased breath sounds bilaterally. No wheezing; crackles in bases   CV:  RRR 3/6 SE murmurs    GI:  Soft,  distended, non tender. Musculoskeletal:  pitting edema in bilat LE,     Neurologic:  Moves all extremities. AAOx3, CN II-XII reviewed     Skin: significant erythema on bilat LE consistent with cellulitis       Data Review:    Reviewed and/or order of clinical lab test      Labs:     No results for input(s): WBC, HGB, HCT, PLT, HGBEXT, HCTEXT, PLTEXT, HGBEXT, HCTEXT, PLTEXT in the last 72 hours. Recent Labs     08/29/20  0454 08/28/20  0434 08/27/20  0216    136 134*   K 3.9 3.4* 4.0    100 99   CO2 30 30 30   BUN 19 23* 24*   CREA 0.79 0.86 1.03   * 90 125*   CA 7.5* 8.0* 8.4*   MG  --   --  2.2     No results for input(s): ALT, AP, TBIL, TBILI, TP, ALB, GLOB, GGT, AML, LPSE in the last 72 hours.     No lab exists for component: SGOT, GPT, AMYP, HLPSE  No results for input(s): INR, PTP, APTT, INREXT, INREXT in the last 72 hours. No results for input(s): FE, TIBC, PSAT, FERR in the last 72 hours. No results found for: FOL, RBCF   No results for input(s): PH, PCO2, PO2 in the last 72 hours. No results for input(s): CPK, CKNDX, TROIQ in the last 72 hours.     No lab exists for component: CPKMB  Lab Results   Component Value Date/Time    Cholesterol, total 76 08/26/2020 04:39 AM    HDL Cholesterol 15 08/26/2020 04:39 AM    LDL, calculated 46.6 08/26/2020 04:39 AM    Triglyceride 72 08/26/2020 04:39 AM    CHOL/HDL Ratio 5.1 (H) 08/26/2020 04:39 AM     Lab Results   Component Value Date/Time    Glucose (POC) 166 (H) 08/28/2020 09:09 PM    Glucose (POC) 78 08/28/2020 06:17 PM    Glucose (POC) 98 08/28/2020 12:38 PM    Glucose (POC) 117 (H) 08/28/2020 08:40 AM    Glucose (POC) 140 (H) 08/27/2020 09:15 PM     Lab Results   Component Value Date/Time    Color YELLOW/STRAW 08/24/2020 08:24 AM    Appearance CLEAR 08/24/2020 08:24 AM    Specific gravity 1.009 08/24/2020 08:24 AM    pH (UA) 5.5 08/24/2020 08:24 AM    Protein Negative 08/24/2020 08:24 AM    Glucose Negative 08/24/2020 08:24 AM    Ketone Negative 08/24/2020 08:24 AM    Bilirubin Negative 08/24/2020 08:24 AM    Urobilinogen 2.0 (H) 08/24/2020 08:24 AM    Nitrites Negative 08/24/2020 08:24 AM    Leukocyte Esterase Negative 08/24/2020 08:24 AM    Epithelial cells FEW 08/24/2020 08:24 AM    Bacteria Negative 08/24/2020 08:24 AM    WBC 0-4 08/24/2020 08:24 AM    RBC 0-5 08/24/2020 08:24 AM         Medications Reviewed:     Current Facility-Administered Medications   Medication Dose Route Frequency    losartan (COZAAR) tablet 50 mg  50 mg Oral DAILY    spironolactone (ALDACTONE) tablet 25 mg  25 mg Oral DAILY    furosemide (LASIX) injection 60 mg  60 mg IntraVENous DAILY    lactulose (CHRONULAC) 10 gram/15 mL solution 45 mL  30 g Oral BID PRN    metoprolol tartrate (LOPRESSOR) tablet 50 mg  50 mg Oral Q12H    methadone (DOLOPHINE) tablet 40 mg  40 mg Oral DAILY    enoxaparin (LOVENOX) injection 160 mg  1 mg/kg SubCUTAneous Q12H    sodium chloride (NS) flush 5-40 mL  5-40 mL IntraVENous Q8H    sodium chloride (NS) flush 5-40 mL  5-40 mL IntraVENous PRN    acetaminophen (TYLENOL) tablet 650 mg  650 mg Oral Q6H PRN    Or    acetaminophen (TYLENOL) suppository 650 mg  650 mg Rectal Q6H PRN    polyethylene glycol (MIRALAX) packet 17 g  17 g Oral DAILY PRN    promethazine (PHENERGAN) tablet 12.5 mg  12.5 mg Oral Q6H PRN    Or    ondansetron (ZOFRAN) injection 4 mg  4 mg IntraVENous Q6H PRN    insulin lispro (HUMALOG) injection   SubCUTAneous AC&HS    glucose chewable tablet 16 g  4 Tab Oral PRN    glucagon (GLUCAGEN) injection 1 mg  1 mg IntraMUSCular PRN    dextrose 10% infusion 0-250 mL  0-250 mL IntraVENous PRN     ______________________________________________________________________  EXPECTED LENGTH OF STAY: 2d 4h  ACTUAL LENGTH OF STAY:          5                 Mary Dior MD

## 2020-08-29 NOTE — PROGRESS NOTES
Cardiology Progress Note                                        Admit Date: 8/24/2020    Assessment/Plan:     1. NICM    Ef 35%  No cad     Elevated lvedp  Continue diuresis   2. afib rvr    SP dccv  Start eliquis   3. htn  4. DM II      Elis Schultz is a 63 y.o. male with      PROBLEM LIST:  Patient Active Problem List    Diagnosis Date Noted    Atrial fibrillation with RVR ([address] 08/24/2020         Subjective:     Rossi Cardona denies none. Visit Vitals  /72 (BP 1 Location: Left arm, BP Patient Position: At rest;Sitting)   Pulse 66   Temp 98.8 °F (37.1 °C)   Resp 18   Ht 5' 10\" (1.778 m)   Wt 157.5 kg (347 lb 4.8 oz)   SpO2 94%   BMI 49.83 kg/m²       Intake/Output Summary (Last 24 hours) at 8/29/2020 1057  Last data filed at 8/28/2020 1520  Gross per 24 hour   Intake 0 ml   Output    Net 0 ml       Objective:      Physical Exam:  HEENT: Perrla, EOMI  Neck: No JVD,  No thyroidmegaly  Resp: CTA bilaterally;  No wheezes or rales  CV: RRR s1s2 No murmur no s3  Abd:Soft, Nontender  Ext: No edema  Neuro: Alert and oriented; Nonfocal  Skin: Warm, Dry, Intact  Pulses: 2+ DP/PT/Rad      Telemetry: normal sinus rhythm    Current Facility-Administered Medications   Medication Dose Route Frequency    losartan (COZAAR) tablet 50 mg  50 mg Oral DAILY    spironolactone (ALDACTONE) tablet 25 mg  25 mg Oral DAILY    furosemide (LASIX) injection 60 mg  60 mg IntraVENous DAILY    lactulose (CHRONULAC) 10 gram/15 mL solution 45 mL  30 g Oral BID PRN    metoprolol tartrate (LOPRESSOR) tablet 50 mg  50 mg Oral Q12H    methadone (DOLOPHINE) tablet 40 mg  40 mg Oral DAILY    enoxaparin (LOVENOX) injection 160 mg  1 mg/kg SubCUTAneous Q12H    sodium chloride (NS) flush 5-40 mL  5-40 mL IntraVENous Q8H    sodium chloride (NS) flush 5-40 mL  5-40 mL IntraVENous PRN    acetaminophen (TYLENOL) tablet 650 mg  650 mg Oral Q6H PRN    Or    acetaminophen (TYLENOL) suppository 650 mg  650 mg Rectal Q6H PRN    polyethylene glycol (MIRALAX) packet 17 g  17 g Oral DAILY PRN    promethazine (PHENERGAN) tablet 12.5 mg  12.5 mg Oral Q6H PRN    Or    ondansetron (ZOFRAN) injection 4 mg  4 mg IntraVENous Q6H PRN    insulin lispro (HUMALOG) injection   SubCUTAneous AC&HS    glucose chewable tablet 16 g  4 Tab Oral PRN    glucagon (GLUCAGEN) injection 1 mg  1 mg IntraMUSCular PRN    dextrose 10% infusion 0-250 mL  0-250 mL IntraVENous PRN         Data Review:   Labs:    Recent Results (from the past 24 hour(s))   GLUCOSE, POC    Collection Time: 08/28/20 12:38 PM   Result Value Ref Range    Glucose (POC) 98 65 - 100 mg/dL    Performed by Jerson Figueroa    GLUCOSE, POC    Collection Time: 08/28/20  6:17 PM   Result Value Ref Range    Glucose (POC) 78 65 - 100 mg/dL    Performed by Jerson Figueroa    GLUCOSE, POC    Collection Time: 08/28/20  9:09 PM   Result Value Ref Range    Glucose (POC) 166 (H) 65 - 100 mg/dL    Performed by [facility]    METABOLIC PANEL, BASIC    Collection Time: 08/29/20  4:54 AM   Result Value Ref Range    Sodium 139 136 - 145 mmol/L    Potassium 3.9 3.5 - 5.1 mmol/L    Chloride 103 97 - 108 mmol/L    CO2 30 21 - 32 mmol/L    Anion gap 6 5 - 15 mmol/L    Glucose 125 (H) 65 - 100 mg/dL    BUN 19 6 - 20 MG/DL    Creatinine 0.79 0.70 - 1.30 MG/DL    BUN/Creatinine ratio 24 (H) 12 - 20      GFR est AA >60 >60 ml/min/1.73m2    GFR est non-AA >60 >60 ml/min/1.73m2    Calcium 7.5 (L) 8.5 - 10.1 MG/DL   GLUCOSE, POC    Collection Time: 08/29/20  8:08 AM   Result Value Ref Range    Glucose (POC) 141 (H) 65 - 100 mg/dL    Performed by Yen Giang

## 2020-08-29 NOTE — PROGRESS NOTES
[facility]  Hospitalist Group                                                                                          Hospitalist Progress Note  Ana Jones MD  Answering service: [phone] OR [phone] from in house phone        NAME:  Maria Esther Enriquez  :  1965  MRN:  [id number]      Admission Summary:   Maria Esther Enriquez is a 63 y.o. male who presents for evaluation of shortness of breath and leg swelling. Patient has been having swelling in the legs right more than left along with shortness of breath mainly on exertion. He denies any significant orthopnea but he states he always sleeps propped up. He denies any chest pain or palpitation. He was seen by his PCP 2 days ago and was prescribed Lasix and antibiotic for redness in his legs. But his symptoms not significantly improved and therefore presented to the emergency room. Patient was noted to have atrial fibrillation with rapid ventricular response. Also has elevated BNP and chest x-ray showing cardiomegaly with early interstitial edema. Patient has no known history of atrial fibrillation. Patient was started on Cardizem drip and hospitalist service requested to admit the patient for further evaluation management. Interval history / Subjective:   Patient seen and examined today. he is feeling ok and is in NSR after cardioversion   Plans for him to go to the cath lab today  COVID neg     Assessment & Plan:     1. Atrial fibrillation with rapid ventricular response  -New onset atrial fibrillation with RVR- cardioverted   Cardiology adjusting cardiac meds     2. Acute heart failure type unknown.  -cont IV Lasix, daily weights, monitor I&Os  - echocardiogram showing EF 35-40%- previous was normal  Pt to go to the cath lab today     3.  Hypertension  -stable BP on current meds      4 Diabetes  -Insulin sliding scale coverage  Accu-Cheks  Hemoglobin A1c 7.6      5. bilat LE cellulitis- monitor for now    Code status: full  DVT prophylaxis: scd    Care Plan discussed with: Patient/Family  Anticipated Disposition: Home w/Family  Anticipated Discharge: Less than 24 hours     Hospital Problems  Never Reviewed          Codes Class Noted POA    Atrial fibrillation with RVR [facility]) ICD-10-CM: I48.91  ICD-9-CM: 427.31  8/24/2020 Unknown                Review of Systems:   A comprehensive review of systems was negative except for that written in the HPI. Vital Signs:    Last 24hrs VS reviewed since prior progress note. Most recent are:  Visit Vitals  /72 (BP 1 Location: Left arm, BP Patient Position: At rest;Sitting)   Pulse 66   Temp 98.8 °F (37.1 °C)   Resp 18   Ht 5' 10\" (1.778 m)   Wt 157.5 kg (347 lb 4.8 oz)   SpO2 94%   BMI 49.83 kg/m²         Intake/Output Summary (Last 24 hours) at 8/29/2020 0739  Last data filed at 8/28/2020 1520  Gross per 24 hour   Intake 0 ml   Output    Net 0 ml        Physical Examination:             Constitutional:  No acute distress, cooperative, pleasant    ENT:  Oral mucosa dry   Resp:  decreased breath sounds bilaterally. No wheezing; crackles in bases   CV:  RRR 3/6 SE murmurs    GI:  Soft,  distended, non tender. Musculoskeletal:  pitting edema in bilat LE,     Neurologic:  Moves all extremities. AAOx3, CN II-XII reviewed     Skin: significant erythema on bilat LE consistent with cellulitis       Data Review:    Reviewed and/or order of clinical lab test      Labs:     No results for input(s): WBC, HGB, HCT, PLT, HGBEXT, HCTEXT, PLTEXT, HGBEXT, HCTEXT, PLTEXT in the last 72 hours. Recent Labs     08/29/20  0454 08/28/20  0434 08/27/20  0216    136 134*   K 3.9 3.4* 4.0    100 99   CO2 30 30 30   BUN 19 23* 24*   CREA 0.79 0.86 1.03   * 90 125*   CA 7.5* 8.0* 8.4*   MG  --   --  2.2     No results for input(s): ALT, AP, TBIL, TBILI, TP, ALB, GLOB, GGT, AML, LPSE in the last 72 hours.     No lab exists for component: SGOT, GPT, AMYP, HLPSE  No results for input(s): INR, PTP, APTT, INREXT, INREXT in the last 72 hours. No results for input(s): FE, TIBC, PSAT, FERR in the last 72 hours. No results found for: FOL, RBCF   No results for input(s): PH, PCO2, PO2 in the last 72 hours. No results for input(s): CPK, CKNDX, TROIQ in the last 72 hours. No lab exists for component: CPKMB  Lab Results   Component Value Date/Time    Cholesterol, total 76 08/26/2020 04:39 AM    HDL Cholesterol 15 08/26/2020 04:39 AM    LDL, calculated 46.6 08/26/2020 04:39 AM    Triglyceride 72 08/26/2020 04:39 AM    CHOL/HDL Ratio 5.1 (H) 08/26/2020 04:39 AM     Lab Results   Component Value Date/Time    Glucose (POC) 166 (H) 08/28/2020 09:09 PM    Glucose (POC) 78 08/28/2020 06:17 PM    Glucose (POC) 98 08/28/2020 12:38 PM    Glucose (POC) 117 (H) 08/28/2020 08:40 AM    Glucose (POC) 140 (H) 08/27/2020 09:15 PM     Lab Results   Component Value Date/Time    Color YELLOW/STRAW 08/24/2020 08:24 AM    Appearance CLEAR 08/24/2020 08:24 AM    Specific gravity 1.009 08/24/2020 08:24 AM    pH (UA) 5.5 08/24/2020 08:24 AM    Protein Negative 08/24/2020 08:24 AM    Glucose Negative 08/24/2020 08:24 AM    Ketone Negative 08/24/2020 08:24 AM    Bilirubin Negative 08/24/2020 08:24 AM    Urobilinogen 2.0 (H) 08/24/2020 08:24 AM    Nitrites Negative 08/24/2020 08:24 AM    Leukocyte Esterase Negative 08/24/2020 08:24 AM    Epithelial cells FEW 08/24/2020 08:24 AM    Bacteria Negative 08/24/2020 08:24 AM    WBC 0-4 08/24/2020 08:24 AM    RBC 0-5 08/24/2020 08:24 AM     ECHO 8/27/20  Echo Findings     Left Ventricle  Left ventricle not well visualized. Upper normal wall thickness. The estimated EF is 35 - 40%. Moderately reduced systolic function. There is moderate (grade 2) left ventricular diastolic dysfunction. Left Atrium  Normal cavity size. Right Ventricle  Not well visualized. Right Atrium  Normal cavity size. Aortic Valve  Aortic valve not well visualized. No stenosis and no regurgitation. Mitral Valve  Mitral valve not well visualized. Tricuspid Valve  Tricuspid valve not well visualized. No stenosis. Mild regurgitation. Pulmonic Valve  Pulmonic valve not well visualized. Aorta  Normal aortic root. Pulmonary Artery  Pulmonary hypertension found to be moderate. Pericardium  No evidence of pericardial effusion.           Medications Reviewed:     Current Facility-Administered Medications   Medication Dose Route Frequency    losartan (COZAAR) tablet 50 mg  50 mg Oral DAILY    spironolactone (ALDACTONE) tablet 25 mg  25 mg Oral DAILY    furosemide (LASIX) injection 60 mg  60 mg IntraVENous DAILY    lactulose (CHRONULAC) 10 gram/15 mL solution 45 mL  30 g Oral BID PRN    metoprolol tartrate (LOPRESSOR) tablet 50 mg  50 mg Oral Q12H    methadone (DOLOPHINE) tablet 40 mg  40 mg Oral DAILY    enoxaparin (LOVENOX) injection 160 mg  1 mg/kg SubCUTAneous Q12H    sodium chloride (NS) flush 5-40 mL  5-40 mL IntraVENous Q8H    sodium chloride (NS) flush 5-40 mL  5-40 mL IntraVENous PRN    acetaminophen (TYLENOL) tablet 650 mg  650 mg Oral Q6H PRN    Or    acetaminophen (TYLENOL) suppository 650 mg  650 mg Rectal Q6H PRN    polyethylene glycol (MIRALAX) packet 17 g  17 g Oral DAILY PRN    promethazine (PHENERGAN) tablet 12.5 mg  12.5 mg Oral Q6H PRN    Or    ondansetron (ZOFRAN) injection 4 mg  4 mg IntraVENous Q6H PRN    insulin lispro (HUMALOG) injection   SubCUTAneous AC&HS    glucose chewable tablet 16 g  4 Tab Oral PRN    glucagon (GLUCAGEN) injection 1 mg  1 mg IntraMUSCular PRN    dextrose 10% infusion 0-250 mL  0-250 mL IntraVENous PRN     ______________________________________________________________________  EXPECTED LENGTH OF STAY: 2d 4h  ACTUAL LENGTH OF STAY:          5                 Jesús Jean MD

## 2020-08-30 VITALS
BODY MASS INDEX: 45.1 KG/M2 | HEIGHT: 70 IN | RESPIRATION RATE: 19 BRPM | WEIGHT: 315 LBS | OXYGEN SATURATION: 98 % | SYSTOLIC BLOOD PRESSURE: 151 MMHG | HEART RATE: 63 BPM | TEMPERATURE: 97.9 F | DIASTOLIC BLOOD PRESSURE: 109 MMHG

## 2020-08-30 LAB
ANION GAP SERPL CALC-SCNC: 5 MMOL/L (ref 5–15)
BUN SERPL-MCNC: 16 MG/DL (ref 6–20)
BUN/CREAT SERPL: 21 (ref 12–20)
CALCIUM SERPL-MCNC: 8.3 MG/DL (ref 8.5–10.1)
CHLORIDE SERPL-SCNC: 102 MMOL/L (ref 97–108)
CO2 SERPL-SCNC: 28 MMOL/L (ref 21–32)
CREAT SERPL-MCNC: 0.77 MG/DL (ref 0.7–1.3)
GLUCOSE BLD STRIP.AUTO-MCNC: 102 MG/DL (ref 65–100)
GLUCOSE SERPL-MCNC: 103 MG/DL (ref 65–100)
POTASSIUM SERPL-SCNC: 4 MMOL/L (ref 3.5–5.1)
SERVICE CMNT-IMP: ABNORMAL
SODIUM SERPL-SCNC: 135 MMOL/L (ref 136–145)

## 2020-08-30 PROCEDURE — 74011250637 HC RX REV CODE- 250/637: Performed by: INTERNAL MEDICINE

## 2020-08-30 PROCEDURE — 82962 GLUCOSE BLOOD TEST: CPT

## 2020-08-30 PROCEDURE — 74011250636 HC RX REV CODE- 250/636: Performed by: INTERNAL MEDICINE

## 2020-08-30 PROCEDURE — 80048 BASIC METABOLIC PNL TOTAL CA: CPT

## 2020-08-30 PROCEDURE — 36415 COLL VENOUS BLD VENIPUNCTURE: CPT

## 2020-08-30 PROCEDURE — 74011250636 HC RX REV CODE- 250/636: Performed by: FAMILY MEDICINE

## 2020-08-30 RX ORDER — CEPHALEXIN 250 MG/1
250 CAPSULE ORAL 4 TIMES DAILY
Qty: 32 CAP | Refills: 0 | Status: SHIPPED | OUTPATIENT
Start: 2020-08-30

## 2020-08-30 RX ORDER — SPIRONOLACTONE 25 MG/1
25 TABLET ORAL DAILY
Qty: 30 TAB | Refills: 5 | Status: SHIPPED | OUTPATIENT
Start: 2020-08-31

## 2020-08-30 RX ORDER — SPIRONOLACTONE 25 MG/1
25 TABLET ORAL DAILY
Status: DISCONTINUED | OUTPATIENT
Start: 2020-08-30 | End: 2020-08-30 | Stop reason: SDUPTHER

## 2020-08-30 RX ORDER — MUPIROCIN CALCIUM 20 MG/G
1 CREAM TOPICAL DAILY
Qty: 30 G | Refills: 5 | Status: SHIPPED | OUTPATIENT
Start: 2020-08-30

## 2020-08-30 RX ORDER — LOSARTAN POTASSIUM 50 MG/1
50 TABLET ORAL DAILY
Qty: 30 TAB | Refills: 5 | Status: SHIPPED | OUTPATIENT
Start: 2020-08-31

## 2020-08-30 RX ORDER — FUROSEMIDE 40 MG/1
40 TABLET ORAL DAILY
Status: DISCONTINUED | OUTPATIENT
Start: 2020-08-30 | End: 2020-08-30 | Stop reason: HOSPADM

## 2020-08-30 RX ORDER — CHLORHEXIDINE GLUCONATE 4 G/100ML
10 SOLUTION TOPICAL DAILY
Qty: 300 ML | Refills: 3 | Status: SHIPPED | OUTPATIENT
Start: 2020-08-30 | End: 2020-09-29

## 2020-08-30 RX ORDER — METOPROLOL TARTRATE 50 MG/1
50 TABLET ORAL EVERY 12 HOURS
Qty: 60 TAB | Refills: 5 | Status: SHIPPED | OUTPATIENT
Start: 2020-08-30

## 2020-08-30 RX ADMIN — SPIRONOLACTONE 25 MG: 25 TABLET ORAL at 08:24

## 2020-08-30 RX ADMIN — VANCOMYCIN HYDROCHLORIDE 2000 MG: 10 INJECTION, POWDER, LYOPHILIZED, FOR SOLUTION INTRAVENOUS at 01:00

## 2020-08-30 RX ADMIN — LOSARTAN POTASSIUM 50 MG: 50 TABLET, FILM COATED ORAL at 08:24

## 2020-08-30 RX ADMIN — APIXABAN 5 MG: 5 TABLET, FILM COATED ORAL at 08:24

## 2020-08-30 RX ADMIN — FUROSEMIDE 40 MG: 40 TABLET ORAL at 10:25

## 2020-08-30 RX ADMIN — Medication 10 ML: at 10:26

## 2020-08-30 RX ADMIN — METOPROLOL TARTRATE 50 MG: 50 TABLET, FILM COATED ORAL at 08:24

## 2020-08-30 RX ADMIN — FUROSEMIDE 60 MG: 10 INJECTION, SOLUTION INTRAMUSCULAR; INTRAVENOUS at 08:24

## 2020-08-30 NOTE — PROGRESS NOTES
Cardiology Progress Note                                        Admit Date: 8/24/2020    Assessment/Plan:     1. NICM    Ef 35%  No cad     Elevated lvedp  Continue diuresis added aldactone and switched to po lasix  2. afib rvr    SP dccv no antiarrhythmics due to methadone   Start eliquis   3. htn  4. DM II    Ok for [employer] today with fu with cardiology in one week ( Dr. Florin Foss)       Kayleigh Grove is a 63 y.o. male with      PROBLEM LIST:  Patient Active Problem List    Diagnosis Date Noted    Atrial fibrillation with RVR ([address] 08/24/2020         Subjective:     Kris Ast HIRO Cardona denies none. Visit Vitals  BP (!) 151/109 (BP 1 Location: Right arm, BP Patient Position: Sitting)   Pulse 63   Temp 97.9 °F (36.6 °C)   Resp 19   Ht 5' 10\" (1.778 m)   Wt 155.1 kg (342 lb)   SpO2 98%   BMI 49.07 kg/m²     No intake or output data in the 24 hours ending 08/30/20 0852    Objective:      Physical Exam:  HEENT: Perrla, EOMI  Neck: No JVD,  No thyroidmegaly  Resp: CTA bilaterally;  No wheezes or rales  CV: RRR s1s2 No murmur no s3  Abd:Soft, Nontender  Ext: No edema  Neuro: Alert and oriented; Nonfocal  Skin: Warm, Dry, Intact  Pulses: 2+ DP/PT/Rad      Telemetry: normal sinus rhythm    Current Facility-Administered Medications   Medication Dose Route Frequency    furosemide (LASIX) tablet 40 mg  40 mg Oral DAILY    spironolactone (ALDACTONE) tablet 25 mg  25 mg Oral DAILY    apixaban (ELIQUIS) tablet 5 mg  5 mg Oral BID    Vancomycin - Pharmacy to dose   Other Rx Dosing/Monitoring    vancomycin (VANCOCIN) 2000 mg in  ml infusion  2,000 mg IntraVENous Q12H    losartan (COZAAR) tablet 50 mg  50 mg Oral DAILY    spironolactone (ALDACTONE) tablet 25 mg  25 mg Oral DAILY    lactulose (CHRONULAC) 10 gram/15 mL solution 45 mL  30 g Oral BID PRN    metoprolol tartrate (LOPRESSOR) tablet 50 mg  50 mg Oral Q12H    methadone (DOLOPHINE) tablet 40 mg  40 mg Oral DAILY    sodium chloride (NS) flush 5-40 mL  5-40 mL IntraVENous Q8H    sodium chloride (NS) flush 5-40 mL  5-40 mL IntraVENous PRN    acetaminophen (TYLENOL) tablet 650 mg  650 mg Oral Q6H PRN    Or    acetaminophen (TYLENOL) suppository 650 mg  650 mg Rectal Q6H PRN    polyethylene glycol (MIRALAX) packet 17 g  17 g Oral DAILY PRN    promethazine (PHENERGAN) tablet 12.5 mg  12.5 mg Oral Q6H PRN    Or    ondansetron (ZOFRAN) injection 4 mg  4 mg IntraVENous Q6H PRN    insulin lispro (HUMALOG) injection   SubCUTAneous AC&HS    glucose chewable tablet 16 g  4 Tab Oral PRN    glucagon (GLUCAGEN) injection 1 mg  1 mg IntraMUSCular PRN    dextrose 10% infusion 0-250 mL  0-250 mL IntraVENous PRN         Data Review:   Labs:    Recent Results (from the past 24 hour(s))   GLUCOSE, POC    Collection Time: 08/29/20 11:51 AM   Result Value Ref Range    Glucose (POC) 134 (H) 65 - 100 mg/dL    Performed by Elvia Reyes, POC    Collection Time: 08/29/20  5:13 PM   Result Value Ref Range    Glucose (POC) 114 (H) 65 - 100 mg/dL    Performed by Elvia Reyes, POC    Collection Time: 08/29/20  9:38 PM   Result Value Ref Range    Glucose (POC) 105 (H) 65 - 100 mg/dL    Performed by Christen Bottom    METABOLIC PANEL, BASIC    Collection Time: 08/30/20  7:07 AM   Result Value Ref Range    Sodium 135 (L) 136 - 145 mmol/L    Potassium 4.0 3.5 - 5.1 mmol/L    Chloride 102 97 - 108 mmol/L    CO2 28 21 - 32 mmol/L    Anion gap 5 5 - 15 mmol/L    Glucose 103 (H) 65 - 100 mg/dL    BUN 16 6 - 20 MG/DL    Creatinine 0.77 0.70 - 1.30 MG/DL    BUN/Creatinine ratio 21 (H) 12 - 20      GFR est AA >60 >60 ml/min/1.73m2    GFR est non-AA >60 >60 ml/min/1.73m2    Calcium 8.3 (L) 8.5 - 10.1 MG/DL   GLUCOSE, POC    Collection Time: 08/30/20  7:56 AM   Result Value Ref Range    Glucose (POC) 102 (H) 65 - 100 mg/dL    Performed by Jayson Dyson

## 2020-08-30 NOTE — DISCHARGE INSTRUCTIONS
Diet- low salt/ heart healthy; carbohydrate consistent diabetic diet  Avoid drinking excessive amounts of liquid daily to prevent volume overload, worsening of heart failure  Check your weight daily- target weight < 340 pounds;   if you weight increases by more than 5 pounds (345 or greater) reduce liquid intake    You are on a blood thinner called eliquis- be sure and inform all physicians prior to any procedures like injections, colonoscopy, or dental procedures that you are taking this med    Follow Up with cardiology Dr. Liat Koenig next week  [facility]  [address] [facility]  [phone]  Appt scheduled for Tuesday(9/1/20) at 12:30 pm     Follow up with primary care MD for medication update in 10-14 days  Take your discharge papers from the hospital with you    Use lactulose as needed for constipation  For lower leg cellulitis- continue wound care as follows  - wash lower legs with antibacterial soap daily- Hibiclens  - dry, then apply small amount of bactroban antibiotic ointment to areas of redness or blisters daily    ,

## 2020-08-30 NOTE — PROGRESS NOTES
Day #2 of Vancomycin  Indication:  bilateral lower extremity cellulitis  Current regimen:  vanc 2g q12h  Abx regimen:  vanc monotherapy  ID Following ?: NO  Concomitant nephrotoxic drugs (requires more frequent monitoring): Loop diuretics  Frequency of BMP?: daily    Recent Labs     20  0707 20  0454 20  0434   CREA 0.77 0.79 0.86   BUN 16 19 23*     Est CrCl: >120 ml/min; UO: -- ml/kg/hr  Temp (24hrs), Av °F (36.7 °C), Min:97.6 °F (36.4 °C), Max:98.5 °F (36.9 °C)    Cultures:   none    Goal trough = 10 - 15 mcg/mL    Recent trough history (date/time/level/dose/action taken):  N/a    Plan: Continue current regimen. Will assess dosing once at steady state.     Hudson Chung, [facility]

## 2020-08-30 NOTE — PROGRESS NOTES
1130 I have reviewed discharge instructions with the patient. The patient verbalized understanding. Discharge medications reviewed with patient and appropriate educational materials and side effects teaching were provided.

## 2020-08-30 NOTE — PROGRESS NOTES
Bedside shift change report given to Juanita Salgado (oncoming nurse) by Mary Spencer (offgoing nurse). Report included the following information SBAR, Kardex, MAR, Recent Results, Med Rec Status and Cardiac Rhythm NSR, sinus lee ann.      Problem: Afib: Discharge Outcomes  Goal: Stable cardiac rhythm  Outcome: Progressing Towards Goal  Note- NSR/sinus lee ann    Problem: Diabetes Self-Management  Goal: Using medications safely      Outcome: Progressing Towards Goal  Note- achs glucose

## 2020-09-03 NOTE — DISCHARGE SUMMARY
Discharge Summary       PATIENT ID: Shanna Sarabia  MRN: [id number]   YOB: 1965    DATE OF ADMISSION: 8/24/2020 11:28 AM    DATE OF DISCHARGE: 8/30/20    PRIMARY CARE PROVIDER: Meng Stearns MD     ATTENDING PHYSICIAN: April France  DISCHARGING PROVIDER: Marlon Whalen MD    To contact this individual call [phone] and ask the  to page. If unavailable ask to be transferred the Adult Hospitalist Department. CONSULTATIONS: IP CONSULT TO CARDIOLOGY  IP CONSULT TO CARDIOLOGY    PROCEDURES/SURGERIES: Procedure(s):  LEFT HEART CATH / CORONARY ANGIOGRAPHY    ADMITTING [facility] COURSE:   Jumana Cardona is a 63 y. o. male who presents for evaluation of shortness of breath and leg swelling.  Patient has been having swelling in the legs right more than left along with shortness of breath mainly on exertion.  He denies any significant orthopnea but he states he always sleeps propped up. [facility] denies any chest pain or palpitation.  He was seen by his PCP 2 days ago and was prescribed Lasix and antibiotic for redness in his legs.  But his symptoms not significantly improved and therefore presented to the emergency room.  Patient was noted to have atrial fibrillation with rapid ventricular response.  Also has elevated BNP and chest x-ray showing cardiomegaly with early interstitial edema.  Patient has no known history of atrial fibrillation.  Patient was started on Cardizem drip and hospitalist service requested to admit the patient for further evaluation management. DISCHARGE DIAGNOSES / PLAN:      1. Atrial fibrillation with rapid ventricular response  -New onset atrial fibrillation with RVR- cardioverted 8/26  Cardiology adjusted cardiac meds     2. Acute heart failure type unknown.  -cont IV Lasix, daily weights, monitor I&Os  - echocardiogram showing EF 35-40%- previous was normal  Patient taken to the cath lab 8/28/20- normal coronaries     3.  Hypertension  -stable BP on current meds       4 Diabetes  -Insulin sliding scale coverage  Accu-Cheks  Hemoglobin A1c 7.6      5. bilat LE cellulitis- treated with IV vanc inpatient - home on PO keflex     ADDITIONAL CARE RECOMMENDATIONS:   Diet- low salt/ heart healthy; carbohydrate consistent diabetic diet  Avoid drinking excessive amounts of liquid daily to prevent volume overload, worsening of heart failure  Check your weight daily- target weight < 340 pounds;   if you weight increases by more than 5 pounds (345 or greater) reduce liquid intake    You are on a blood thinner called eliquis- be sure and inform all physicians prior to any procedures like injections, colonoscopy, or dental procedures that you are taking this med    Follow Up with cardiology Dr. Morenita العراقي next week  [facility]  [address] [facility]  [phone]  Appt scheduled for Tuesday(9/1/20) at 12:30 pm     Follow up with primary care MD for medication update in 10-14 days  Take your discharge papers from the hospital with you    Use lactulose as needed for constipation  For lower leg cellulitis- continue wound care as follows  - wash lower legs with antibacterial soap daily- Hibiclens  - dry, then apply small amount of bactroban antibiotic ointment to areas of redness or blisters daily    ,        PENDING TEST RESULTS:   At the time of discharge the following test results are still pending: none    FOLLOW UP APPOINTMENTS:    Follow-up Information     Follow up With Specialties Details Why Contact Info    Casey Torres MD Cardiology On 9/1/2020 at 12:30 pm on 9/1/20 [facility]  [address]  [phone]      Chastity Leonard MD Family Medicine   [address] [id number] [facility]  [phone]               DIET: see above    ACTIVITY: Activity as tolerated    WOUND CARE: to legs- see above    EQUIPMENT needed: none      DISCHARGE MEDICATIONS:  Discharge Medication List as of 8/30/2020 11:29 AM START taking these medications    Details   chlorhexidine (HIBICLENS) 4 % liquid Apply 10 mL to affected area daily for 30 days. Wash lower legs daily to prevent infection, Print, Disp-300 mL,R-3      apixaban (ELIQUIS) 5 mg tablet Take 1 Tab by mouth two (2) times a day. Indications: treatment to prevent blood clots in chronic atrial fibrillation, Print, Disp-60 Tab,R-5      lactulose (CHRONULAC) 10 gram/15 mL solution Take 45 mL by mouth two (2) times daily as needed for Other (constipation) for up to 30 days. , Print, Disp-1 Bottle,R-4      losartan (COZAAR) 50 mg tablet Take 1 Tab by mouth daily. , Print, Disp-30 Tab,R-5      metoprolol tartrate (LOPRESSOR) 50 mg tablet Take 1 Tab by mouth every twelve (12) hours. , Print, Disp-60 Tab,R-5      spironolactone (ALDACTONE) 25 mg tablet Take 1 Tab by mouth daily. , Print, Disp-30 Tab,R-5      mupirocin calcium (Bactroban) 2 % topical cream Apply 1 g to affected area daily. Apply to rash on legs daily, Print, Disp-30 g,R-5      cephALEXin (Keflex) 250 mg capsule Take 1 Cap by mouth four (4) times daily. , Print, Disp-32 Cap,R-0         CONTINUE these medications which have NOT CHANGED    Details   metFORMIN (GLUMETZA ER) 500 mg TG24 24 hour tablet Take 1,000 mg by mouth two (2) times a day., Historical Med      furosemide (LASIX) 40 mg tablet Take 40 mg by mouth daily. , Historical Med      METHADONE HCL (METHADONE PO) Take 40 mg by mouth daily. , Historical Med         STOP taking these medications       cefadroxil (DURICEF) 500 mg capsule Comments:   Reason for Stopping:         hydrochlorothiazide (HYDRODIURIL) 25 mg tablet Comments:   Reason for Stopping:         lisinopril (PRINIVIL, ZESTRIL) 10 mg tablet Comments:   Reason for Stopping:                 NOTIFY YOUR PHYSICIAN FOR ANY OF THE FOLLOWING:   Fever over 101 degrees for 24 hours. Chest pain, shortness of breath, fever, chills, nausea, vomiting, diarrhea, change in mentation, falling, weakness, bleeding. Severe pain or pain not relieved by medications. Or, any other signs or symptoms that you may have questions about. DISPOSITION:    Home With:   OT  PT  HH  RN       Long term SNF/Inpatient Rehab   X Independent/assisted living    Hospice    Other:       PATIENT CONDITION AT DISCHARGE:     Functional status    Poor     Deconditioned    X Independent      Cognition   X  Lucid     Forgetful     Dementia      Catheters/lines (plus indication)    Romano     PICC     PEG    X None      Code status   X  Full code     DNR      PHYSICAL EXAMINATION AT DISCHARGE:  Visit Vitals  BP (!) 151/109 (BP 1 Location: Right arm, BP Patient Position: Sitting)   Pulse 63   Temp 97.9 °F (36.6 °C)   Resp 19   Ht 5' 10\" (1.778 m)   Wt 155.1 kg (342 lb)   SpO2 98%   BMI 49.07 kg/m²     No intake or output data in the 24 hours ending 08/30/20 0852    Constitutional:  No acute distress, cooperative, pleasant    ENT:  Oral mucosa dry   Resp:  decreased breath sounds bilaterally. No wheezing; crackles in bases   CV:  RRR 3/6 SE murmurs    GI:  Soft,  distended, non tender. Musculoskeletal:  pitting edema in bilat LE,     Neurologic:  Moves all extremities. AAOx3, CN II-XII reviewed                         Skin: significant erythema on bilat LE consistent with cellulitis                 Data Review:    Reviewed and/or order of clinical lab test        Labs:      No results for input(s): WBC, HGB, HCT, PLT, HGBEXT, HCTEXT, PLTEXT, HGBEXT, HCTEXT, PLTEXT in the last 72 hours. Recent Labs     08/29/20  0454 08/28/20  0434 08/27/20  0216    136 134*   K 3.9 3.4* 4.0    100 99   CO2 30 30 30   BUN 19 23* 24*   CREA 0.79 0.86 1.03   * 90 125*   CA 7.5* 8.0* 8.4*   MG  --   --  2.2     No results for input(s): ALT, AP, TBIL, TBILI, TP, ALB, GLOB, GGT, AML, LPSE in the last 72 hours.     No lab exists for component: SGOT, GPT, AMYP, HLPSE  No results for input(s): INR, PTP, APTT, INREXT, INREXT in the last 72 hours. No results for input(s): FE, TIBC, PSAT, FERR in the last 72 hours. No results found for: FOL, RBCF   No results for input(s): PH, PCO2, PO2 in the last 72 hours. No results for input(s): CPK, CKNDX, TROIQ in the last 72 hours.     No lab exists for component: CPKMB        Lab Results   Component Value Date/Time     Cholesterol, total 76 08/26/2020 04:39 AM     HDL Cholesterol 15 08/26/2020 04:39 AM     LDL, calculated 46.6 08/26/2020 04:39 AM     Triglyceride 72 08/26/2020 04:39 AM     CHOL/HDL Ratio 5.1 (H) 08/26/2020 04:39 AM           Lab Results   Component Value Date/Time     Glucose (POC) 114 (H) 08/29/2020 05:13 PM     Glucose (POC) 134 (H) 08/29/2020 11:51 AM     Glucose (POC) 141 (H) 08/29/2020 08:08 AM     Glucose (POC) 166 (H) 08/28/2020 09:09 PM     Glucose (POC) 78 08/28/2020 06:17 PM           Lab Results   Component Value Date/Time     Color YELLOW/STRAW 08/24/2020 08:24 AM     Appearance CLEAR 08/24/2020 08:24 AM     Specific gravity 1.009 08/24/2020 08:24 AM     pH (UA) 5.5 08/24/2020 08:24 AM     Protein Negative 08/24/2020 08:24 AM     Glucose Negative 08/24/2020 08:24 AM     Ketone Negative 08/24/2020 08:24 AM     Bilirubin Negative 08/24/2020 08:24 AM     Urobilinogen 2.0 (H) 08/24/2020 08:24 AM     Nitrites Negative 08/24/2020 08:24 AM     Leukocyte Esterase Negative 08/24/2020 08:24 AM     Epithelial cells FEW 08/24/2020 08:24 AM     Bacteria Negative 08/24/2020 08:24 AM     WBC 0-4 08/24/2020 08:24 AM     RBC 0-5 08/24/2020 08:24 AM     ECHO 8/27/20  Echo Findings      Left Ventricle  Left ventricle not well visualized. Upper normal wall thickness. The estimated EF is 35 - 40%. Moderately reduced systolic function. There is moderate (grade 2) left ventricular diastolic dysfunction. Left Atrium  Normal cavity size. Right Ventricle  Not well visualized. Right Atrium  Normal cavity size. Aortic Valve  Aortic valve not well visualized. No stenosis and no regurgitation. Mitral Valve  Mitral valve not well visualized. Tricuspid Valve  Tricuspid valve not well visualized. No stenosis. Mild regurgitation. Pulmonic Valve  Pulmonic valve not well visualized. Aorta  Normal aortic root. Pulmonary Artery  Pulmonary hypertension found to be moderate.     Pericardium  No evidence of pericardial effusion.               CHRONIC MEDICAL DIAGNOSES:  Problem List as of 8/30/2020 Never Reviewed          Codes Class Noted - Resolved    Atrial fibrillation with RVR [facility]) ICD-10-CM: I48.91  ICD-9-CM: 427.31  8/24/2020 - Present              Greater than 30 minutes were spent with the patient on counseling and coordination of care    Signed:   Sophie Shaw MD  9/2/2020  11:03 PM

## 2022-03-19 PROBLEM — I48.91 ATRIAL FIBRILLATION WITH RVR (HCC): Status: ACTIVE | Noted: 2020-08-24

## 2022-08-24 NOTE — PROGRESS NOTES
4569: Pt on amio gtt and scheduled to receive 40mg methadone. Warning popped up of potential QT elongation with using both medications. Pt's monitor and EKG read pt's QT/QTc already >500. Cardiologist Dr Nicole russo. Will d/c amio gtt, hold methadone for two hours, then give methadone. Problem: Afib Pathway: Day 3  Goal: Medications  Outcome: Progressing Towards Goal  Note: Amio gtt d/c this AM. Pt stayed in SB/SR throughout shift. Problem: Diabetes Self-Management  Goal: *Developing strategies to promote health/change behavior  Description: Define the ABC's of diabetes; identify appropriate screenings, schedule and personal plan for screenings. Outcome: Progressing Towards Goal  Note: DM saw pt today - pt educated on how to perform own blood sugar checks and administer insulin through sliding scale. Pt verbalized understanding. Last 3 Recorded Weights in this Encounter    08/26/20 0446 08/27/20 0400 08/27/20 1520   Weight: (!) 164.7 kg (363 lb 1.6 oz) 151.7 kg (334 lb 7 oz) 151.5 kg (334 lb)     Bedside shift change report given to Mirella Parsons RN (oncoming nurse) by Irene Vicente RN (offgoing nurse). Report included the following information SBAR, Kardex, ED Summary, Procedure Summary, Intake/Output, MAR, Recent Results and Cardiac Rhythm SB/SR. no

## 2023-05-22 RX ORDER — CEPHALEXIN 250 MG/1
250 CAPSULE ORAL 4 TIMES DAILY
COMMUNITY
Start: 2020-08-30

## 2023-05-22 RX ORDER — METFORMIN HYDROCHLORIDE 500 MG/1
1000 TABLET, FILM COATED, EXTENDED RELEASE ORAL 2 TIMES DAILY
COMMUNITY

## 2023-05-22 RX ORDER — METOPROLOL TARTRATE 50 MG/1
50 TABLET, FILM COATED ORAL EVERY 12 HOURS
COMMUNITY
Start: 2020-08-30

## 2023-05-22 RX ORDER — FUROSEMIDE 40 MG/1
40 TABLET ORAL DAILY
COMMUNITY

## 2023-05-22 RX ORDER — MUPIROCIN CALCIUM 20 MG/G
1 CREAM TOPICAL DAILY
COMMUNITY
Start: 2020-08-30

## 2023-05-22 RX ORDER — LOSARTAN POTASSIUM 50 MG/1
50 TABLET ORAL DAILY
COMMUNITY
Start: 2020-08-31

## 2023-05-22 RX ORDER — SPIRONOLACTONE 25 MG/1
25 TABLET ORAL DAILY
COMMUNITY
Start: 2020-08-31

## 2024-09-24 NOTE — PROGRESS NOTES
Lovenox Monitoring  Indication: AFIB  Recent Labs     08/25/20  0134 08/24/20  0824   HGB  --  12.4   PLT  --  240   CREA 0.81 0.86     Current Weight: 158.3 kg  Est. CrCl = >120 ml/min  Current Dose: 150 mg subcutaneously every 12 hours.   Plan: Change to 160 mg Q12hr    (CLOSE I-vent after review and verification) Recheck CBC  If getting worse, evaluate further  Not a snorer.

## (undated) DEVICE — ANGIOGRAPHIC CATHETER: Brand: IMPULSE™

## (undated) DEVICE — ANGIOGRAPHY KIT

## (undated) DEVICE — TR BAND RADIAL ARTERY COMPRESSION DEVICE: Brand: TR BAND

## (undated) DEVICE — KIT MED IMAG CNTRST AGNT W/ IOPAMIDOL REUSE

## (undated) DEVICE — WASTE KIT - ST MARY: Brand: MEDLINE INDUSTRIES, INC.

## (undated) DEVICE — GLIDESHEATH SLENDER ACCESS KIT: Brand: GLIDESHEATH SLENDER

## (undated) DEVICE — GUIDEWIRE VASC L260CM DIA0.035IN TIP L3MM STD EXCHG PTFE J

## (undated) DEVICE — KIT MFLD ISOLATN NACL CNTRST PRT TBNG SPIK W/ PRSS TRNSDUC

## (undated) DEVICE — KIT HND CTRL 3 W STPCOCK ROT END 54IN PREM HI PRSS TBNG AT

## (undated) DEVICE — PACK PROCEDURE SURG HRT CATH